# Patient Record
Sex: MALE | Race: WHITE | Employment: FULL TIME | URBAN - NONMETROPOLITAN AREA
[De-identification: names, ages, dates, MRNs, and addresses within clinical notes are randomized per-mention and may not be internally consistent; named-entity substitution may affect disease eponyms.]

---

## 2018-01-22 ENCOUNTER — HOSPITAL ENCOUNTER (EMERGENCY)
Age: 58
Discharge: HOME | End: 2018-01-22
Attending: EMERGENCY MEDICINE

## 2018-01-22 VITALS
RESPIRATION RATE: 18 BRPM | HEIGHT: 66 IN | WEIGHT: 230 LBS | BODY MASS INDEX: 36.96 KG/M2 | TEMPERATURE: 99 F | DIASTOLIC BLOOD PRESSURE: 90 MMHG | SYSTOLIC BLOOD PRESSURE: 138 MMHG | HEART RATE: 98 BPM | OXYGEN SATURATION: 97 %

## 2018-01-22 DIAGNOSIS — J02.9 ACUTE PHARYNGITIS, UNSPECIFIED ETIOLOGY: Primary | ICD-10-CM

## 2018-01-22 LAB
DIRECT EXAM: NORMAL
Lab: NORMAL
SPECIMEN DESCRIPTION: NORMAL
STATUS: NORMAL

## 2018-01-22 PROCEDURE — 99283 EMERGENCY DEPT VISIT LOW MDM: CPT

## 2018-01-22 PROCEDURE — 87804 INFLUENZA ASSAY W/OPTIC: CPT

## 2018-01-22 PROCEDURE — 87651 STREP A DNA AMP PROBE: CPT

## 2018-01-22 PROCEDURE — 6370000000 HC RX 637 (ALT 250 FOR IP): Performed by: EMERGENCY MEDICINE

## 2018-01-22 RX ORDER — AMOXICILLIN AND CLAVULANATE POTASSIUM 875; 125 MG/1; MG/1
1 TABLET, FILM COATED ORAL 2 TIMES DAILY
Qty: 20 TABLET | Refills: 0 | Status: SHIPPED | OUTPATIENT
Start: 2018-01-22 | End: 2018-02-01

## 2018-01-22 RX ORDER — AMOXICILLIN AND CLAVULANATE POTASSIUM 875; 125 MG/1; MG/1
1 TABLET, FILM COATED ORAL ONCE
Status: COMPLETED | OUTPATIENT
Start: 2018-01-22 | End: 2018-01-22

## 2018-01-22 RX ORDER — ACETAMINOPHEN 160 MG/5ML
1000 SOLUTION ORAL ONCE
Status: COMPLETED | OUTPATIENT
Start: 2018-01-22 | End: 2018-01-22

## 2018-01-22 RX ORDER — ACETAMINOPHEN 500 MG
1000 TABLET ORAL ONCE
Status: DISCONTINUED | OUTPATIENT
Start: 2018-01-22 | End: 2018-01-22

## 2018-01-22 RX ADMIN — LIDOCAINE HYDROCHLORIDE 15 ML: 20 SOLUTION ORAL; TOPICAL at 05:41

## 2018-01-22 RX ADMIN — AMOXICILLIN AND CLAVULANATE POTASSIUM 1 TABLET: 875; 125 TABLET, FILM COATED ORAL at 06:24

## 2018-01-22 RX ADMIN — ACETAMINOPHEN 1000 MG: 160 SOLUTION ORAL at 05:41

## 2018-01-22 ASSESSMENT — PAIN DESCRIPTION - PAIN TYPE
TYPE: ACUTE PAIN
TYPE: ACUTE PAIN

## 2018-01-22 ASSESSMENT — PAIN DESCRIPTION - DESCRIPTORS: DESCRIPTORS: ACHING;SORE

## 2018-01-22 ASSESSMENT — PAIN SCALES - GENERAL
PAINLEVEL_OUTOF10: 9
PAINLEVEL_OUTOF10: 6

## 2018-01-22 ASSESSMENT — PAIN DESCRIPTION - LOCATION
LOCATION: THROAT
LOCATION: THROAT

## 2018-01-22 NOTE — ED PROVIDER NOTES
suspect a false-negative strep. A backup culture will be obtained. Labs Reviewed   STREP SCREEN GROUP A THROAT   RAPID INFLUENZA A/B ANTIGENS   STREP A DNA PROBE, AMPLIFICATION         Labs Reviewed   STREP SCREEN GROUP A THROAT   RAPID INFLUENZA A/B ANTIGENS   STREP A DNA PROBE, AMPLIFICATION       FINAL IMPRESSION    1. Acute pharyngitis, unspecified etiology             Summation      Patient Course:        ED Medications administered this visit:    Medications   amoxicillin-clavulanate (AUGMENTIN) 875-125 MG per tablet 1 tablet (not administered)   lidocaine viscous (XYLOCAINE) 2 % solution 15 mL (15 mLs Mouth/Throat Given 1/22/18 0541)   acetaminophen (TYLENOL) 160 MG/5ML solution 1,000 mg (1,000 mg Oral Given 1/22/18 0541)       New Prescriptions from this visit:    New Prescriptions    AMOXICILLIN-CLAVULANATE (AUGMENTIN) 875-125 MG PER TABLET    Take 1 tablet by mouth 2 times daily for 10 days    LIDOCAINE VISCOUS (XYLOCAINE) 2 % SOLUTION    15 ML throat gargle every 4 hours as needed for throat pain. Follow-up:  family physician      As needed        Final Impression:   1.  Acute pharyngitis, unspecified etiology               (Please note that portions of this note were completed with a voice recognition program.  Efforts were made to edit the dictations but occasionally words are mis-transcribed.)          Ranulfo Ritchie DO  01/22/18 6524

## 2018-01-23 RX ORDER — AMOXICILLIN AND CLAVULANATE POTASSIUM 250; 62.5 MG/5ML; MG/5ML
500 POWDER, FOR SUSPENSION ORAL 3 TIMES DAILY
Qty: 300 ML | Refills: 0 | Status: SHIPPED | OUTPATIENT
Start: 2018-01-23 | End: 2018-02-02

## 2019-06-11 ENCOUNTER — HOSPITAL ENCOUNTER (INPATIENT)
Age: 59
LOS: 2 days | Discharge: HOME | DRG: 638 | End: 2019-06-13
Attending: EMERGENCY MEDICINE | Admitting: FAMILY MEDICINE
Payer: MEDICAID

## 2019-06-11 ENCOUNTER — APPOINTMENT (OUTPATIENT)
Dept: GENERAL RADIOLOGY | Age: 59
DRG: 638 | End: 2019-06-11

## 2019-06-11 ENCOUNTER — APPOINTMENT (OUTPATIENT)
Dept: CT IMAGING | Age: 59
DRG: 638 | End: 2019-06-11

## 2019-06-11 DIAGNOSIS — R10.13 EPIGASTRIC PAIN: ICD-10-CM

## 2019-06-11 DIAGNOSIS — E11.00 DIABETIC HYPEROSMOLAR NON-KETOTIC STATE (HCC): Primary | ICD-10-CM

## 2019-06-11 DIAGNOSIS — E11.9 DIABETES MELLITUS, NEW ONSET (HCC): ICD-10-CM

## 2019-06-11 PROBLEM — N17.9 AKI (ACUTE KIDNEY INJURY) (HCC): Status: ACTIVE | Noted: 2019-06-11

## 2019-06-11 LAB
-: NORMAL
ABSOLUTE EOS #: 0.03 K/UL (ref 0–0.44)
ABSOLUTE IMMATURE GRANULOCYTE: 0.04 K/UL (ref 0–0.3)
ABSOLUTE LYMPH #: 1.52 K/UL (ref 1.1–3.7)
ABSOLUTE MONO #: 0.67 K/UL (ref 0.1–1.2)
ALBUMIN SERPL-MCNC: 4.2 G/DL (ref 3.5–5.2)
ALBUMIN/GLOBULIN RATIO: 0.9 (ref 1–2.5)
ALLEN TEST: NORMAL
ALP BLD-CCNC: 284 U/L (ref 40–129)
ALT SERPL-CCNC: 54 U/L (ref 5–41)
AMORPHOUS: NORMAL
ANION GAP SERPL CALCULATED.3IONS-SCNC: 14 MMOL/L (ref 9–17)
ANION GAP SERPL CALCULATED.3IONS-SCNC: 20 MMOL/L (ref 9–17)
ANION GAP SERPL CALCULATED.3IONS-SCNC: 8 MMOL/L (ref 9–17)
AST SERPL-CCNC: 38 U/L
BACTERIA: NORMAL
BASOPHILS # BLD: 1 % (ref 0–2)
BASOPHILS ABSOLUTE: 0.05 K/UL (ref 0–0.2)
BETA-HYDROXYBUTYRATE: 0.36 MMOL/L (ref 0.02–0.27)
BILIRUB SERPL-MCNC: 0.78 MG/DL (ref 0.3–1.2)
BILIRUBIN URINE: NEGATIVE
BUN BLDV-MCNC: 13 MG/DL (ref 6–20)
BUN BLDV-MCNC: 15 MG/DL (ref 6–20)
BUN BLDV-MCNC: 18 MG/DL (ref 6–20)
BUN/CREAT BLD: 14 (ref 9–20)
BUN/CREAT BLD: 14 (ref 9–20)
BUN/CREAT BLD: 15 (ref 9–20)
CALCIUM SERPL-MCNC: 8.2 MG/DL (ref 8.6–10.4)
CALCIUM SERPL-MCNC: 8.8 MG/DL (ref 8.6–10.4)
CALCIUM SERPL-MCNC: 9.6 MG/DL (ref 8.6–10.4)
CARBOXYHEMOGLOBIN: NORMAL % (ref 0–5)
CASTS UA: NORMAL /LPF
CHLORIDE BLD-SCNC: 81 MMOL/L (ref 98–107)
CHLORIDE BLD-SCNC: 95 MMOL/L (ref 98–107)
CHLORIDE BLD-SCNC: 96 MMOL/L (ref 98–107)
CO2: 22 MMOL/L (ref 20–31)
CO2: 23 MMOL/L (ref 20–31)
CO2: 28 MMOL/L (ref 20–31)
COLOR: YELLOW
COMMENT UA: ABNORMAL
CREAT SERPL-MCNC: 0.9 MG/DL (ref 0.7–1.2)
CREAT SERPL-MCNC: 1.02 MG/DL (ref 0.7–1.2)
CREAT SERPL-MCNC: 1.33 MG/DL (ref 0.7–1.2)
CRYSTALS, UA: NORMAL /HPF
DIFFERENTIAL TYPE: ABNORMAL
EKG ATRIAL RATE: 96 BPM
EKG P AXIS: 74 DEGREES
EKG P-R INTERVAL: 168 MS
EKG Q-T INTERVAL: 344 MS
EKG QRS DURATION: 78 MS
EKG QTC CALCULATION (BAZETT): 434 MS
EKG R AXIS: -6 DEGREES
EKG T AXIS: 19 DEGREES
EKG VENTRICULAR RATE: 96 BPM
EOSINOPHILS RELATIVE PERCENT: 0 % (ref 1–4)
EPITHELIAL CELLS UA: NORMAL /HPF (ref 0–5)
ESTIMATED AVERAGE GLUCOSE: 272 MG/DL
FIO2: NORMAL
GFR AFRICAN AMERICAN: >60 ML/MIN
GFR NON-AFRICAN AMERICAN: 55 ML/MIN
GFR NON-AFRICAN AMERICAN: >60 ML/MIN
GFR NON-AFRICAN AMERICAN: >60 ML/MIN
GFR SERPL CREATININE-BSD FRML MDRD: ABNORMAL ML/MIN/{1.73_M2}
GLUCOSE BLD-MCNC: 181 MG/DL (ref 74–100)
GLUCOSE BLD-MCNC: 189 MG/DL (ref 74–100)
GLUCOSE BLD-MCNC: 194 MG/DL (ref 74–100)
GLUCOSE BLD-MCNC: 201 MG/DL (ref 70–99)
GLUCOSE BLD-MCNC: 212 MG/DL (ref 74–100)
GLUCOSE BLD-MCNC: 225 MG/DL (ref 74–100)
GLUCOSE BLD-MCNC: 225 MG/DL (ref 74–100)
GLUCOSE BLD-MCNC: 261 MG/DL (ref 74–100)
GLUCOSE BLD-MCNC: 308 MG/DL (ref 74–100)
GLUCOSE BLD-MCNC: 316 MG/DL (ref 74–100)
GLUCOSE BLD-MCNC: 346 MG/DL (ref 74–100)
GLUCOSE BLD-MCNC: 372 MG/DL (ref 74–100)
GLUCOSE BLD-MCNC: 373 MG/DL (ref 70–99)
GLUCOSE BLD-MCNC: 542 MG/DL (ref 74–100)
GLUCOSE BLD-MCNC: 956 MG/DL (ref 70–99)
GLUCOSE BLD-MCNC: >600 MG/DL (ref 74–100)
GLUCOSE URINE: ABNORMAL
HBA1C MFR BLD: 11.1 % (ref 4.8–5.9)
HCO3 VENOUS: 24.5 MMOL/L (ref 24–30)
HCT VFR BLD CALC: 51.4 % (ref 40.7–50.3)
HEMOGLOBIN: 17.9 G/DL (ref 13–17)
IMMATURE GRANULOCYTES: 0 %
KETONES, URINE: NEGATIVE
LACTIC ACID: 2.6 MMOL/L (ref 0.5–2.2)
LACTIC ACID: 4.8 MMOL/L (ref 0.5–2.2)
LEUKOCYTE ESTERASE, URINE: NEGATIVE
LIPASE: 112 U/L (ref 13–60)
LYMPHOCYTES # BLD: 16 % (ref 24–43)
MAGNESIUM: 2.1 MG/DL (ref 1.6–2.6)
MAGNESIUM: 2.6 MG/DL (ref 1.6–2.6)
MCH RBC QN AUTO: 31.5 PG (ref 25.2–33.5)
MCHC RBC AUTO-ENTMCNC: 34.8 G/DL (ref 28.4–34.8)
MCV RBC AUTO: 90.5 FL (ref 82.6–102.9)
METHEMOGLOBIN: NORMAL % (ref 0–1.9)
MODE: NORMAL
MONOCYTES # BLD: 7 % (ref 3–12)
MUCUS: NORMAL
NEGATIVE BASE EXCESS, VEN: 1.3 MMOL/L (ref 0–2)
NITRITE, URINE: NEGATIVE
NOTIFICATION TIME: NORMAL
NOTIFICATION: NORMAL
NRBC AUTOMATED: 0 PER 100 WBC
O2 DEVICE/FLOW/%: NORMAL
O2 SAT, VEN: 73.7 % (ref 60–85)
OTHER OBSERVATIONS UA: NORMAL
OXYHEMOGLOBIN: NORMAL % (ref 95–98)
PATIENT TEMP: 37
PCO2, VEN, TEMP ADJ: NORMAL MMHG (ref 39–55)
PCO2, VEN: 44.9 (ref 39–55)
PDW BLD-RTO: 12.2 % (ref 11.8–14.4)
PEEP/CPAP: NORMAL
PH UA: 5 (ref 5–9)
PH VENOUS: 7.35 (ref 7.32–7.42)
PH, VEN, TEMP ADJ: NORMAL (ref 7.32–7.42)
PHOSPHORUS: 2.3 MG/DL (ref 2.5–4.5)
PHOSPHORUS: 2.5 MG/DL (ref 2.5–4.5)
PLATELET # BLD: 164 K/UL (ref 138–453)
PLATELET ESTIMATE: ABNORMAL
PMV BLD AUTO: 12.2 FL (ref 8.1–13.5)
PO2, VEN, TEMP ADJ: NORMAL MMHG (ref 30–50)
PO2, VEN: 40.9 (ref 30–50)
POSITIVE BASE EXCESS, VEN: NORMAL MMOL/L (ref 0–2)
POTASSIUM SERPL-SCNC: 3.3 MMOL/L (ref 3.7–5.3)
POTASSIUM SERPL-SCNC: 4.2 MMOL/L (ref 3.7–5.3)
POTASSIUM SERPL-SCNC: 5 MMOL/L (ref 3.7–5.3)
PROTEIN UA: NEGATIVE
PSV: NORMAL
PT. POSITION: NORMAL
RBC # BLD: 5.68 M/UL (ref 4.21–5.77)
RBC # BLD: ABNORMAL 10*6/UL
RBC UA: NORMAL /HPF (ref 0–2)
RENAL EPITHELIAL, UA: NORMAL /HPF
RESPIRATORY RATE: NORMAL
SAMPLE SITE: NORMAL
SEG NEUTROPHILS: 75 % (ref 36–65)
SEGMENTED NEUTROPHILS ABSOLUTE COUNT: 6.98 K/UL (ref 1.5–8.1)
SET RATE: NORMAL
SODIUM BLD-SCNC: 123 MMOL/L (ref 135–144)
SODIUM BLD-SCNC: 131 MMOL/L (ref 135–144)
SODIUM BLD-SCNC: 133 MMOL/L (ref 135–144)
SPECIFIC GRAVITY UA: <1.005 (ref 1.01–1.02)
TEXT FOR RESPIRATORY: NORMAL
TOTAL CK: 168 U/L (ref 39–308)
TOTAL HB: NORMAL G/DL (ref 12–16)
TOTAL PROTEIN: 8.7 G/DL (ref 6.4–8.3)
TOTAL RATE: NORMAL
TRICHOMONAS: NORMAL
TROPONIN INTERP: NORMAL
TROPONIN T: <0.03 NG/ML
TROPONIN, HIGH SENSITIVITY: NORMAL NG/L (ref 0–22)
TURBIDITY: CLEAR
URINE HGB: NEGATIVE
UROBILINOGEN, URINE: NORMAL
VT: NORMAL
WBC # BLD: 9.3 K/UL (ref 3.5–11.3)
WBC # BLD: ABNORMAL 10*3/UL
WBC UA: NORMAL /HPF (ref 0–5)
YEAST: NORMAL

## 2019-06-11 PROCEDURE — 6370000000 HC RX 637 (ALT 250 FOR IP): Performed by: FAMILY MEDICINE

## 2019-06-11 PROCEDURE — 2000000000 HC ICU R&B

## 2019-06-11 PROCEDURE — 83605 ASSAY OF LACTIC ACID: CPT

## 2019-06-11 PROCEDURE — 82010 KETONE BODYS QUAN: CPT

## 2019-06-11 PROCEDURE — 84484 ASSAY OF TROPONIN QUANT: CPT

## 2019-06-11 PROCEDURE — 82805 BLOOD GASES W/O2 SATURATION: CPT

## 2019-06-11 PROCEDURE — 83690 ASSAY OF LIPASE: CPT

## 2019-06-11 PROCEDURE — 6370000000 HC RX 637 (ALT 250 FOR IP): Performed by: EMERGENCY MEDICINE

## 2019-06-11 PROCEDURE — 6360000002 HC RX W HCPCS: Performed by: EMERGENCY MEDICINE

## 2019-06-11 PROCEDURE — 82550 ASSAY OF CK (CPK): CPT

## 2019-06-11 PROCEDURE — 85025 COMPLETE CBC W/AUTO DIFF WBC: CPT

## 2019-06-11 PROCEDURE — 2500000003 HC RX 250 WO HCPCS: Performed by: PHYSICIAN ASSISTANT

## 2019-06-11 PROCEDURE — 74177 CT ABD & PELVIS W/CONTRAST: CPT

## 2019-06-11 PROCEDURE — 80048 BASIC METABOLIC PNL TOTAL CA: CPT

## 2019-06-11 PROCEDURE — 80053 COMPREHEN METABOLIC PANEL: CPT

## 2019-06-11 PROCEDURE — 2580000003 HC RX 258: Performed by: EMERGENCY MEDICINE

## 2019-06-11 PROCEDURE — 2580000003 HC RX 258: Performed by: PHYSICIAN ASSISTANT

## 2019-06-11 PROCEDURE — 6360000004 HC RX CONTRAST MEDICATION: Performed by: EMERGENCY MEDICINE

## 2019-06-11 PROCEDURE — 96374 THER/PROPH/DIAG INJ IV PUSH: CPT

## 2019-06-11 PROCEDURE — 99285 EMERGENCY DEPT VISIT HI MDM: CPT

## 2019-06-11 PROCEDURE — 6360000002 HC RX W HCPCS: Performed by: PHYSICIAN ASSISTANT

## 2019-06-11 PROCEDURE — 81001 URINALYSIS AUTO W/SCOPE: CPT

## 2019-06-11 PROCEDURE — 71046 X-RAY EXAM CHEST 2 VIEWS: CPT

## 2019-06-11 PROCEDURE — 96361 HYDRATE IV INFUSION ADD-ON: CPT

## 2019-06-11 PROCEDURE — 83036 HEMOGLOBIN GLYCOSYLATED A1C: CPT

## 2019-06-11 PROCEDURE — 96375 TX/PRO/DX INJ NEW DRUG ADDON: CPT

## 2019-06-11 PROCEDURE — 84100 ASSAY OF PHOSPHORUS: CPT

## 2019-06-11 PROCEDURE — 87086 URINE CULTURE/COLONY COUNT: CPT

## 2019-06-11 PROCEDURE — 93010 ELECTROCARDIOGRAM REPORT: CPT | Performed by: FAMILY MEDICINE

## 2019-06-11 PROCEDURE — 83735 ASSAY OF MAGNESIUM: CPT

## 2019-06-11 PROCEDURE — 36415 COLL VENOUS BLD VENIPUNCTURE: CPT

## 2019-06-11 PROCEDURE — 82947 ASSAY GLUCOSE BLOOD QUANT: CPT

## 2019-06-11 PROCEDURE — 93005 ELECTROCARDIOGRAM TRACING: CPT | Performed by: EMERGENCY MEDICINE

## 2019-06-11 RX ORDER — ONDANSETRON 2 MG/ML
4 INJECTION INTRAMUSCULAR; INTRAVENOUS EVERY 6 HOURS PRN
Status: DISCONTINUED | OUTPATIENT
Start: 2019-06-11 | End: 2019-06-13 | Stop reason: HOSPADM

## 2019-06-11 RX ORDER — DEXTROSE MONOHYDRATE 25 G/50ML
12.5 INJECTION, SOLUTION INTRAVENOUS PRN
Status: DISCONTINUED | OUTPATIENT
Start: 2019-06-11 | End: 2019-06-13 | Stop reason: HOSPADM

## 2019-06-11 RX ORDER — DEXTROSE MONOHYDRATE 50 MG/ML
100 INJECTION, SOLUTION INTRAVENOUS PRN
Status: DISCONTINUED | OUTPATIENT
Start: 2019-06-11 | End: 2019-06-13 | Stop reason: HOSPADM

## 2019-06-11 RX ORDER — MORPHINE SULFATE 2 MG/ML
1 INJECTION, SOLUTION INTRAMUSCULAR; INTRAVENOUS EVERY 4 HOURS PRN
Status: DISCONTINUED | OUTPATIENT
Start: 2019-06-11 | End: 2019-06-13 | Stop reason: HOSPADM

## 2019-06-11 RX ORDER — SYRING-NEEDL,DISP,INSUL,0.3 ML 30 GX5/16"
1 SYRINGE, EMPTY DISPOSABLE MISCELLANEOUS ONCE
Qty: 100 DEVICE | Refills: 0 | Status: SHIPPED | OUTPATIENT
Start: 2019-06-11 | End: 2020-03-11 | Stop reason: SDUPTHER

## 2019-06-11 RX ORDER — ONDANSETRON 2 MG/ML
4 INJECTION INTRAMUSCULAR; INTRAVENOUS EVERY 30 MIN PRN
Status: DISCONTINUED | OUTPATIENT
Start: 2019-06-11 | End: 2019-06-13 | Stop reason: HOSPADM

## 2019-06-11 RX ORDER — GLUCOSAMINE HCL/CHONDROITIN SU 500-400 MG
CAPSULE ORAL
Qty: 100 STRIP | Refills: 0 | Status: SHIPPED | OUTPATIENT
Start: 2019-06-11 | End: 2020-03-11 | Stop reason: SDUPTHER

## 2019-06-11 RX ORDER — PROMETHAZINE HYDROCHLORIDE 25 MG/ML
12.5 INJECTION, SOLUTION INTRAMUSCULAR; INTRAVENOUS EVERY 6 HOURS PRN
Status: DISCONTINUED | OUTPATIENT
Start: 2019-06-11 | End: 2019-06-13 | Stop reason: HOSPADM

## 2019-06-11 RX ORDER — MAGNESIUM SULFATE 1 G/100ML
1 INJECTION INTRAVENOUS PRN
Status: DISCONTINUED | OUTPATIENT
Start: 2019-06-11 | End: 2019-06-13 | Stop reason: HOSPADM

## 2019-06-11 RX ORDER — SODIUM PHOSPHATE, MONOBASIC, MONOHYDRATE 276; 142 MG/ML; MG/ML
INJECTION, SOLUTION INTRAVENOUS
Status: DISPENSED
Start: 2019-06-11 | End: 2019-06-12

## 2019-06-11 RX ORDER — POTASSIUM CHLORIDE 7.45 MG/ML
10 INJECTION INTRAVENOUS PRN
Status: DISCONTINUED | OUTPATIENT
Start: 2019-06-11 | End: 2019-06-13 | Stop reason: HOSPADM

## 2019-06-11 RX ORDER — ZOLPIDEM TARTRATE 5 MG/1
5 TABLET ORAL NIGHTLY PRN
Status: DISCONTINUED | OUTPATIENT
Start: 2019-06-11 | End: 2019-06-13 | Stop reason: HOSPADM

## 2019-06-11 RX ORDER — LORAZEPAM 2 MG/ML
0.5 INJECTION INTRAMUSCULAR ONCE
Status: COMPLETED | OUTPATIENT
Start: 2019-06-11 | End: 2019-06-11

## 2019-06-11 RX ORDER — 0.9 % SODIUM CHLORIDE 0.9 %
1000 INTRAVENOUS SOLUTION INTRAVENOUS ONCE
Status: COMPLETED | OUTPATIENT
Start: 2019-06-11 | End: 2019-06-11

## 2019-06-11 RX ORDER — NICOTINE POLACRILEX 4 MG
15 LOZENGE BUCCAL PRN
Status: DISCONTINUED | OUTPATIENT
Start: 2019-06-11 | End: 2019-06-13 | Stop reason: HOSPADM

## 2019-06-11 RX ORDER — SODIUM CHLORIDE 450 MG/100ML
INJECTION, SOLUTION INTRAVENOUS CONTINUOUS
Status: DISCONTINUED | OUTPATIENT
Start: 2019-06-11 | End: 2019-06-12

## 2019-06-11 RX ORDER — MAGNESIUM HYDROXIDE/ALUMINUM HYDROXICE/SIMETHICONE 120; 1200; 1200 MG/30ML; MG/30ML; MG/30ML
30 SUSPENSION ORAL ONCE
Status: COMPLETED | OUTPATIENT
Start: 2019-06-11 | End: 2019-06-11

## 2019-06-11 RX ORDER — DEXTROSE AND SODIUM CHLORIDE 5; .45 G/100ML; G/100ML
INJECTION, SOLUTION INTRAVENOUS CONTINUOUS PRN
Status: DISCONTINUED | OUTPATIENT
Start: 2019-06-11 | End: 2019-06-13 | Stop reason: HOSPADM

## 2019-06-11 RX ADMIN — IOPAMIDOL 75 ML: 755 INJECTION, SOLUTION INTRAVENOUS at 10:45

## 2019-06-11 RX ADMIN — SODIUM PHOSPHATE, MONOBASIC, MONOHYDRATE 10 MMOL: 276; 142 INJECTION, SOLUTION INTRAVENOUS at 21:17

## 2019-06-11 RX ADMIN — LORAZEPAM 0.5 MG: 2 INJECTION INTRAMUSCULAR; INTRAVENOUS at 10:00

## 2019-06-11 RX ADMIN — SODIUM CHLORIDE 1000 ML: 9 INJECTION, SOLUTION INTRAVENOUS at 10:48

## 2019-06-11 RX ADMIN — POTASSIUM CHLORIDE 10 MEQ: 10 INJECTION, SOLUTION INTRAVENOUS at 16:16

## 2019-06-11 RX ADMIN — SODIUM CHLORIDE 1000 ML: 9 INJECTION, SOLUTION INTRAVENOUS at 09:23

## 2019-06-11 RX ADMIN — DEXTROSE AND SODIUM CHLORIDE: 5; 450 INJECTION, SOLUTION INTRAVENOUS at 17:59

## 2019-06-11 RX ADMIN — SODIUM CHLORIDE 0.1 UNITS/KG/HR: 9 INJECTION, SOLUTION INTRAVENOUS at 11:06

## 2019-06-11 RX ADMIN — SODIUM CHLORIDE: 4.5 INJECTION, SOLUTION INTRAVENOUS at 12:19

## 2019-06-11 RX ADMIN — ZOLPIDEM TARTRATE 5 MG: 5 TABLET ORAL at 21:16

## 2019-06-11 RX ADMIN — SODIUM PHOSPHATE, MONOBASIC, MONOHYDRATE 10 MMOL: 276; 142 INJECTION, SOLUTION INTRAVENOUS at 14:36

## 2019-06-11 RX ADMIN — POTASSIUM CHLORIDE 10 MEQ: 10 INJECTION, SOLUTION INTRAVENOUS at 21:55

## 2019-06-11 RX ADMIN — ONDANSETRON 4 MG: 2 INJECTION INTRAMUSCULAR; INTRAVENOUS at 09:28

## 2019-06-11 RX ADMIN — POTASSIUM CHLORIDE 10 MEQ: 10 INJECTION, SOLUTION INTRAVENOUS at 14:12

## 2019-06-11 RX ADMIN — POTASSIUM CHLORIDE 10 MEQ: 10 INJECTION, SOLUTION INTRAVENOUS at 23:12

## 2019-06-11 RX ADMIN — POTASSIUM CHLORIDE 10 MEQ: 10 INJECTION, SOLUTION INTRAVENOUS at 20:36

## 2019-06-11 RX ADMIN — SODIUM CHLORIDE: 4.5 INJECTION, SOLUTION INTRAVENOUS at 16:19

## 2019-06-11 RX ADMIN — ALUMINUM HYDROXIDE, MAGNESIUM HYDROXIDE, AND SIMETHICONE 30 ML: 200; 200; 20 SUSPENSION ORAL at 09:23

## 2019-06-11 RX ADMIN — POTASSIUM CHLORIDE 10 MEQ: 10 INJECTION, SOLUTION INTRAVENOUS at 15:14

## 2019-06-11 ASSESSMENT — ENCOUNTER SYMPTOMS
FACIAL SWELLING: 0
BACK PAIN: 0
COUGH: 0
SORE THROAT: 0
WHEEZING: 0
TROUBLE SWALLOWING: 0
CHEST TIGHTNESS: 0
PHOTOPHOBIA: 0
ABDOMINAL PAIN: 1
NAUSEA: 1
DIARRHEA: 0
VOICE CHANGE: 0
SHORTNESS OF BREATH: 0
BLOOD IN STOOL: 0
VOMITING: 1

## 2019-06-11 ASSESSMENT — PAIN SCALES - GENERAL
PAINLEVEL_OUTOF10: 0

## 2019-06-11 NOTE — PROGRESS NOTES
Patient admitted to  from ER for DKA. Insulin drip infusing at 10.4 units/hr. Patient alert and oriented and denies any pain or shortness of breath. Patient appears anxious, states he does not like hospital or doctors and has not been to one for \"a long time\". Patient states is has been sick for over a week and having frequent urination and thirst. Navigator, assessment, vitals and blood sugar completed at this time. Educated patient on plan of care and blood glucose checks every hour.

## 2019-06-11 NOTE — ED PROVIDER NOTES
[facility]  EMERGENCY DEPARTMENT     Pt Name: Suzie Arenas  MRN: [id number]  [place] 1960  Date of evaluation: 6/11/2019  Provider: Arie Das DO, [address]       Chief Complaint   Patient presents with    Abdominal Pain     Epigastric burning, intermittent in the morning and at night x 2 weeks    Nausea     Intermittent x 2 weeks    Urinary Frequency     Ongoing x 1 week, with increased thirst       HISTORY OF PRESENT ILLNESS    Suzie Arenas is a 62 y.o. male who presents to the emergency department from home with complaints of epigastric abdominal pain and burning for the past 2 weeks, intermittent nausea and almost daily vomiting, increased frequency urination and increased polydipsia. No history of diabetes. No dark or bloody stools. No hematemesis. Denies any fevers or chills. Denies any weight loss. Has had a good appetite, but complains of a bloated sensation      Triage notes and Nursing notes were reviewed by myself. Any discrepancies are addressed above. PAST MEDICAL HISTORY   History reviewed. No pertinent past medical history. SURGICAL HISTORY       Past Surgical History:   Procedure Laterality Date    APPENDECTOMY         CURRENT MEDICATIONS       Previous Medications    ALBUTEROL (PROVENTIL HFA) 108 (90 BASE) MCG/ACT INHALER    Inhale 2 puffs into the lungs every 6 hours as needed for Wheezing. LIDOCAINE VISCOUS (XYLOCAINE) 2 % SOLUTION    15 ML throat gargle every 4 hours as needed for throat pain. RANITIDINE HCL (ZANTAC 150 MAXIMUM STRENGTH PO)    Take 150 mg by mouth 2 times daily       ALLERGIES     Patient has no known allergies. FAMILY HISTORY     History reviewed. No pertinent family history.      SOCIAL HISTORY       Social History     Socioeconomic History    Marital status:      Spouse name: None    Number of children: None    Years of education: None    Highest education level: None   Occupational History    None Social Needs    Financial resource strain: None    Food insecurity:     Worry: None     Inability: None    Transportation needs:     Medical: None     Non-medical: None   Tobacco Use    Smoking status: Current Every Day Smoker     Packs/day: 1.00     Types: Cigarettes    Smokeless tobacco: Never Used   Substance and Sexual Activity    Alcohol use: No    Drug use: None    Sexual activity: None   Lifestyle    Physical activity:     Days per week: None     Minutes per session: None    Stress: None   Relationships    Social connections:     Talks on phone: None     Gets together: None     Attends Judaism service: None     Active member of club or organization: None     Attends meetings of clubs or organizations: None     Relationship status: None    Intimate partner violence:     Fear of current or ex partner: None     Emotionally abused: None     Physically abused: None     Forced sexual activity: None   Other Topics Concern    None   Social History Narrative    None       REVIEW OF SYSTEMS     Review of Systems   Constitutional: Negative for chills, diaphoresis and fever. HENT: Negative for facial swelling, sore throat, trouble swallowing and voice change. Eyes: Negative for photophobia and visual disturbance. Respiratory: Negative for cough, chest tightness, shortness of breath and wheezing. Cardiovascular: Negative for chest pain, palpitations and leg swelling. Gastrointestinal: Positive for abdominal pain, nausea and vomiting. Negative for blood in stool and diarrhea. Endocrine: Negative for polydipsia and polyuria. Genitourinary: Positive for frequency. Negative for dysuria, flank pain, hematuria and urgency. Musculoskeletal: Negative for back pain, neck pain and neck stiffness. Skin: Negative for pallor and rash. Neurological: Negative for seizures, speech difficulty, weakness, numbness and headaches. Hematological: Does not bruise/bleed easily.    Psychiatric/Behavioral: Negative for confusion. The patient is not nervous/anxious. Except as noted above the remainder of the review of systems was reviewed and is. PHYSICAL EXAM    (up to 7 for level 4, 8 or more for level 5)     ED Triage Vitals [06/11/19 0852]   BP Temp Temp Source Pulse Resp SpO2 Height Weight   -- 96 °F (35.6 °C) Tympanic 103 18 95 % 5' 5\" (1.651 m) 240 lb (108.9 kg)       Physical Exam   Constitutional: He is oriented to person, place, and time. Vital signs are normal. He appears well-developed and well-nourished. Non-toxic appearance. He does not appear ill. No distress. HENT:   Head: Normocephalic and atraumatic. Mouth/Throat: Oropharynx is clear and moist.   Eyes: Pupils are equal, round, and reactive to light. Conjunctivae and EOM are normal. Right conjunctiva is not injected. Left conjunctiva is not injected. No scleral icterus. Neck: Normal range of motion. Neck supple. No tracheal deviation present. No thyromegaly present. Cardiovascular: Normal rate, regular rhythm, normal heart sounds and intact distal pulses. Exam reveals no gallop and no friction rub. No murmur heard. Pulmonary/Chest: Effort normal and breath sounds normal. No stridor. No respiratory distress. He has no wheezes. He has no rales. Abdominal: Soft. Bowel sounds are normal. He exhibits no distension and no mass. There is tenderness. There is no rebound and no guarding. Obese abdomen with some epigastric tenderness  Negative Weston's sign  Nontender McBurney's Point  Negative Rovsig's sign  No bruising or echymosis of abdomen   Musculoskeletal: He exhibits no edema or tenderness. Negative Wilda's Sign bilaterally   Lymphadenopathy:     He has no cervical adenopathy. Neurological: He is alert and oriented to person, place, and time. No cranial nerve deficit. He exhibits normal muscle tone. Coordination normal.   No nystagmus   Skin: Skin is warm and dry. No rash noted. He is not diaphoretic. No erythema. No pallor. Psychiatric: He has a normal mood and affect. His behavior is normal. Thought content normal.   Nursing note and vitals reviewed. DIAGNOSTIC RESULTS     EKG:(none if blank)  All [facility]'s are [facility] who either signs or Co-signs this chart in the absence of a cardiologist.    EKG is reviewed by myself. It shows a sinus rhythm, normal UT, QRS, and QTc intervals. No ectopy and no acute ischemic changes. RADIOLOGY: (none if blank)   Interpretation per the Radiologistbelow, if available at the time of this note:    XR CHEST STANDARD (2 VW)   Final Result   No acute cardiopulmonary disease.          CT ABDOMEN PELVIS W IV CONTRAST Additional Contrast? None    (Results Pending)       LABS:  Labs Reviewed   CBC WITH AUTO DIFFERENTIAL - Abnormal; Notable for the following components:       Result Value    Hemoglobin 17.9 (*)     Hematocrit 51.4 (*)     Seg Neutrophils 75 (*)     Lymphocytes 16 (*)     Eosinophils % 0 (*)     All other components within normal limits   COMPREHENSIVE METABOLIC PANEL W/ REFLEX TO MG FOR LOW K - Abnormal; Notable for the following components:    Glucose 956 (*)     CREATININE 1.33 (*)     Sodium 123 (*)     Chloride 81 (*)     Anion Gap 20 (*)     Alkaline Phosphatase 284 (*)     ALT 54 (*)     Total Protein 8.7 (*)     Albumin/Globulin Ratio 0.9 (*)     GFR Non- 55 (*)     All other components within normal limits   LIPASE - Abnormal; Notable for the following components:    Lipase 112 (*)     All other components within normal limits   URINALYSIS - Abnormal; Notable for the following components:    Glucose, Ur 3+ (*)     Specific Gravity, UA <1.005 (*)     All other components within normal limits   LACTIC ACID - Abnormal; Notable for the following components:    Lactic Acid 4.8 (*)     All other components within normal limits   BETA-HYDROXYBUTYRATE - Abnormal; Notable for the following components:    Beta-Hydroxybutyrate 0.36 (*)     All other components within normal limits   URINE CULTURE   MICROSCOPIC URINALYSIS   BLOOD GAS, VENOUS   CK   TROPONIN   LACTIC ACID   POCT GLUCOSE   POCT GLUCOSE       All other labs were within normal range or not returned as of this dictation. Please note, any cultures that may have been sent were not resulted at the time of this patient visit. EMERGENCY DEPARTMENT COURSE andMedical Decision Making:     MDM/   Patient presents with new onset diabetes, significant hyperglycemia and low hyperosmolar state. He does have slight elevation of the ketones, however is not acidotic. IV fluids were initiated and he was rehydrated with 2 L. Insulin drip was initiated as well. We did look for causes of the hyperglycemia, such as AMI, infection, cholecystitis, pancreatitis, etc.  Case d/w Dr Kim Washington who agrees to admit the patient pending CT Abd/pelvis results. ED Medications administered this visit:    Medications   ondansetron (ZOFRAN) injection 4 mg (4 mg Intravenous Given 6/11/19 0928)   0.9 % sodium chloride bolus (has no administration in time range)   glucose (GLUTOSE) 40 % oral gel 15 g (has no administration in time range)   dextrose 50 % IV solution (has no administration in time range)   glucagon (rDNA) injection 1 mg (has no administration in time range)   dextrose 5 % solution (has no administration in time range)   insulin regular (HUMULIN R;NOVOLIN R) 100 Units in sodium chloride 0.9 % 100 mL infusion (0.1 Units/kg/hr × 108.9 kg Intravenous New Bag 6/11/19 1106)   0.9 % sodium chloride bolus (0 mLs Intravenous Stopped 6/11/19 1038)   aluminum & magnesium hydroxide-simethicone (MAALOX) 200-200-20 MG/5ML suspension 30 mL (30 mLs Oral Given 6/11/19 0923)   LORazepam (ATIVAN) injection 0.5 mg (0.5 mg Intravenous Given 6/11/19 1000)   iopamidol (ISOVUE-370) 76 % injection 75 mL (75 mLs Intravenous Given 6/11/19 1045)         Procedures: (None if blank)       CLINICAL       1.  Diabetic hyperosmolar

## 2019-06-11 NOTE — H&P
ICU Admission [facility] Medicine  History and Physical    Patient:  Shaniqua Davila  MRN: [id number]    Chief Complaint:  Nausea, excessive thirst, abdominal pain    History Obtained From:  patient, electronic medical record    PCP: No primary care provider on file. History of Present Illness: The patient is a 62 y.o. male who presents with 1-2 weeks of worsening nausea, epigastric abdominal pain, and vomiting. Patient also reported excessive thirst and increased urination during that time. He has PHM significant for tobacco abuse, 1pk/day for 40+ years. He has no other PMH as he has not seen a provider for a \"long time. 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 Father and brother had DM. No fever or chills. No ill exposures. Denied neuropathy symptoms. In ER serum glucose over 900. HbA1C 11.1. U/A negative ketones. Anion gap 20. Na 123, K 5.0, Cr 1.33. He was admitted for diabetic nonketotic hyperosmolar state/new onset DM to the ICU. Past Medical History:        Diagnosis Date    Diabetes mellitus ([address]     Tobacco abuse        Past Surgical History:        Procedure Laterality Date    APPENDECTOMY         Family History:       Problem Relation Age of Onset    Diabetes Father     Diabetes Brother        Social History:   TOBACCO:   reports that he has been smoking cigarettes. He has been smoking about 1.00 pack per day. He has never used smokeless tobacco.  ETOH:   reports that he does not drink alcohol. OCCUPATION: installs [employer]    Allergies:  Patient has no known allergies. Medications Prior to Admission:    Prior to Admission medications    Medication Sig Start Date End Date Taking?  Authorizing Provider   raNITIdine HCl (ZANTAC 150 MAXIMUM STRENGTH PO) Take 150 mg by mouth 2 times daily   Yes Historical Provider, MD   Lancet Device MISC 1 Device by Does not apply route once for 1 dose 6/11/19 6/11/19 Yes Keo Guadarrama PA-C   blood glucose monitor strips Test 4 times a day & as needed for symptoms of irregular blood glucose. 6/11/19  Yes Keo Guadarrama PA-C   lidocaine viscous (XYLOCAINE) 2 % solution 15 ML throat gargle every 4 hours as needed for throat pain. 1/22/18   Thersia Brain, DO   albuterol (PROVENTIL HFA) 108 (90 BASE) MCG/ACT inhaler Inhale 2 puffs into the lungs every 6 hours as needed for Wheezing. 1/25/15   Ann Sylvester, APRN - CNP       Review of Systems:  Constitutional:negative  for fevers, and negative for chills. Eyes: negative for visual disturbance   ENT: negative for sore throat, negative nasal congestion, and negative for earache  Respiratory: negative for shortness of breath, negative for cough, and negative for wheezing  Cardiovascular: negative for chest pain, negative for palpitations, and negative for syncope  Gastrointestinal: positive for abdominal pain, positive for nausea,positive for vomiting, negative for diarrhea, negative for constipation, and negative for hematochezia or melena  Genitourinary: negative for dysuria, negative for urinary urgency, negative for urinary frequency, and negative for hematuria  Skin: negative for skin rash, and negative for skin lesions  Neurological: negative for unilateral weakness, numbness or tingling.     Physical Exam:    Vitals:  Temp: 97.8 °F (36.6 °C)  BP: (!) 141/98  Resp: 18  Pulse: 93  SpO2: 95 %  24HR INTAKE/OUTPUT:      Intake/Output Summary (Last 24 hours) at 6/11/2019 1355  Last data filed at 6/11/2019 1259  Gross per 24 hour   Intake --   Output 500 ml   Net -500 ml       Exam:  GEN:  alert and oriented to person, place and time, well-developed and well-nourished, in no acute distress  EYES: PERRL  NECK: normal, supple  PULM: clear to auscultation bilaterally- no wheezes, rales or rhonchi, normal air movement, no respiratory distress  COR: regular rate & rhythm and no murmurs  ABD:  Obese, soft, non-tender, non-distended, normal bowel sounds, no masses or organomegaly  EXT:   no cyanosis, clubbing or edema present    NEURO: follows commands, ALEXIS, no deficits  SKIN:  no rashes or significant lesions  -----------------------------------------------------------------  Diagnostic Data:   Lab Results   Component Value Date    WBC 9.3 06/11/2019    HGB 17.9 (H) 06/11/2019     06/11/2019       Lab Results   Component Value Date    BUN 18 06/11/2019    CREATININE 1.33 (H) 06/11/2019     (L) 06/11/2019    K 5.0 06/11/2019    CALCIUM 9.6 06/11/2019    CL 81 (LL) 06/11/2019    CO2 22 06/11/2019    LABGLOM 55 (L) 06/11/2019       Lab Results   Component Value Date    WBCUA None 06/11/2019    RBCUA None 06/11/2019    EPITHUA 0 TO 2 06/11/2019    LEUKOCYTESUR NEGATIVE 06/11/2019    SPECGRAV <1.005 (L) 06/11/2019    GLUCOSEU 3+ (A) 06/11/2019    KETUA NEGATIVE 06/11/2019    PROTEINU NEGATIVE 06/11/2019    HGBUR NEGATIVE 06/11/2019    CASTUA NOT REPORTED 06/11/2019    CRYSTUA NOT REPORTED 06/11/2019    BACTERIA NOT REPORTED 06/11/2019    YEAST NOT REPORTED 06/11/2019       Lab Results   Component Value Date    TROPONINT <0.03 06/11/2019    CKTOTAL 168 06/11/2019       Xr Chest Standard (2 Vw)    Result Date: 6/11/2019  EXAMINATION: TWO XRAY VIEWS OF THE CHEST 6/11/2019 10:53 am COMPARISON: None. HISTORY: ORDERING SYSTEM PROVIDED HISTORY: rule out infection TECHNOLOGIST PROVIDED HISTORY: rule quot infection Acute symptoms. Initial encounter. FINDINGS: Lungs are clear. No pleural effusion or pneumothorax. Normal cardiomediastinal silhouette and pulmonary vascularity. No acute osseous abnormality. No acute cardiopulmonary disease. Ct Abdomen Pelvis W Iv Contrast Additional Contrast? None    Result Date: 6/11/2019  EXAMINATION: CT OF THE ABDOMEN AND PELVIS WITH CONTRAST 6/11/2019 10:44 am TECHNIQUE: CT of the abdomen and pelvis was performed with the administration of intravenous contrast. Multiplanar reformatted images are provided for review.  Dose modulation, iterative reconstruction, and/or weight based adjustment of the mA/kV was utilized to reduce the radiation dose to as low as reasonably achievable. COMPARISON: None. HISTORY: ORDERING SYSTEM PROVIDED HISTORY: epigastric abd pain, bloating, vomiting x2 wks TECHNOLOGIST PROVIDED HISTORY: FINDINGS: Lower Chest: No acute abnormality noted in the included lung bases and heart base. No effusions. Organs: Hepatomegaly with hepatic steatosis is evident. Spleen, pancreas, gallbladder, adrenals, and kidneys are unremarkable with the exception of a 12 mm right hepatic cyst. GI/Bowel: No free fluid, free air, bowel obstruction, or bowel wall thickening is noted. Fat-containing periumbilical hernia of 2.3 cm without strangulation is evident. Sigmoid diverticulosis, moderate, without evidence of acute diverticulitis is noted. Pelvis: No abnormal fluid collection, pelvic or inguinal adenopathy is noted. Left inguinal ring is dilated and fat containing without strangulation. As previously noted, moderate sigmoid diverticulosis without acute diverticulitis is noted. Bladder is unremarkable. Peritoneum/Retroperitoneum: No aortic aneurysm, retroperitoneal mass, retroperitoneal or mesenteric adenopathy is seen. Bones/Soft Tissues: No acute osseous or soft tissue abnormality. Multilevel degenerative changes in the spine. Estimated biologic radiation dose for this procedure:1027.12 mGy/cm2.     1.  Hepatomegaly and hepatic steatosis without space-occupying mass lesion. 2.  Sigmoid diverticulosis without acute diverticulitis. 3.  Periumbilical and left inguinal ring fat containing hernias without strangulation. 4.  Other incidental findings as above. There is no bowel obstruction, gallstones, or appendicitis. EKG interpretation: NSR      Assessment:    Principal Problem:    Diabetic hyperosmolar non-ketotic state ([address]  Active Problems:    Diabetes mellitus, new onset ([address]    DENA (acute kidney injury) ([address]    Tobacco abuse  Resolved Problems:    * No resolved hospital problems. *      Patient Active Problem List    Diagnosis Date Noted    Diabetic hyperosmolar non-ketotic state ([facility] 06/11/2019    Diabetes mellitus, new onset ([facility] 06/11/2019    DENA (acute kidney injury) ([facility] 06/11/2019    Tobacco abuse        Plan:     · This patient requires inpatient ICU admission because of diabetic non ketotic hyperosmolar state, new onset DM, DENA  · Factors affecting the medical complexity of this patient include tobacco abuse  · Estimated length of stay is 3 days  · Diabetic educator   · Dietician consult  · DKA protocol (same treatment as diabetic nonketotic hyperosmolar state)  · Needs to establish PCP  · High risk medications: insulin gtt    I spent 45 minutes providing critical care management to this patient. This excludes time spent in performing separately billed procedures    CORE MEASURES  DVT prophylaxis: Lovenox  Decubitus ulcer present on admission: No  CODE STATUS: FULL CODE  Nutrition Status: fair   Physical therapy: NA   Old Charts reviewed: No  EKG Reviewed: Yes  Advance Directive Addressed:  Yes    Iggy Alvarenga PA-C  6/11/2019, 1:55 PM

## 2019-06-12 PROBLEM — E87.6 HYPOKALEMIA: Status: ACTIVE | Noted: 2019-06-12

## 2019-06-12 PROBLEM — I10 HYPERTENSION: Status: ACTIVE | Noted: 2019-06-12

## 2019-06-12 PROBLEM — E44.0 MODERATE MALNUTRITION (HCC): Status: ACTIVE | Noted: 2019-06-12

## 2019-06-12 LAB
ANION GAP SERPL CALCULATED.3IONS-SCNC: 7 MMOL/L (ref 9–17)
BUN BLDV-MCNC: 12 MG/DL (ref 6–20)
BUN/CREAT BLD: 14 (ref 9–20)
CALCIUM SERPL-MCNC: 7.9 MG/DL (ref 8.6–10.4)
CHLORIDE BLD-SCNC: 100 MMOL/L (ref 98–107)
CO2: 27 MMOL/L (ref 20–31)
CREAT SERPL-MCNC: 0.84 MG/DL (ref 0.7–1.2)
CULTURE: NORMAL
GFR AFRICAN AMERICAN: >60 ML/MIN
GFR NON-AFRICAN AMERICAN: >60 ML/MIN
GFR SERPL CREATININE-BSD FRML MDRD: ABNORMAL ML/MIN/{1.73_M2}
GFR SERPL CREATININE-BSD FRML MDRD: ABNORMAL ML/MIN/{1.73_M2}
GLUCOSE BLD-MCNC: 150 MG/DL (ref 74–100)
GLUCOSE BLD-MCNC: 162 MG/DL (ref 74–100)
GLUCOSE BLD-MCNC: 167 MG/DL (ref 74–100)
GLUCOSE BLD-MCNC: 170 MG/DL (ref 74–100)
GLUCOSE BLD-MCNC: 171 MG/DL (ref 70–99)
GLUCOSE BLD-MCNC: 173 MG/DL (ref 74–100)
GLUCOSE BLD-MCNC: 177 MG/DL (ref 74–100)
GLUCOSE BLD-MCNC: 179 MG/DL (ref 74–100)
GLUCOSE BLD-MCNC: 185 MG/DL (ref 74–100)
GLUCOSE BLD-MCNC: 218 MG/DL (ref 74–100)
GLUCOSE BLD-MCNC: 259 MG/DL (ref 74–100)
GLUCOSE BLD-MCNC: 292 MG/DL (ref 74–100)
GLUCOSE BLD-MCNC: 328 MG/DL (ref 74–100)
Lab: NORMAL
MAGNESIUM: 2 MG/DL (ref 1.6–2.6)
PHOSPHORUS: 2.4 MG/DL (ref 2.5–4.5)
POTASSIUM SERPL-SCNC: 3.4 MMOL/L (ref 3.7–5.3)
SODIUM BLD-SCNC: 134 MMOL/L (ref 135–144)
SPECIMEN DESCRIPTION: NORMAL

## 2019-06-12 PROCEDURE — 6370000000 HC RX 637 (ALT 250 FOR IP): Performed by: PHYSICIAN ASSISTANT

## 2019-06-12 PROCEDURE — 80048 BASIC METABOLIC PNL TOTAL CA: CPT

## 2019-06-12 PROCEDURE — 82947 ASSAY GLUCOSE BLOOD QUANT: CPT

## 2019-06-12 PROCEDURE — 2580000003 HC RX 258: Performed by: EMERGENCY MEDICINE

## 2019-06-12 PROCEDURE — 83735 ASSAY OF MAGNESIUM: CPT

## 2019-06-12 PROCEDURE — 36415 COLL VENOUS BLD VENIPUNCTURE: CPT

## 2019-06-12 PROCEDURE — 2500000003 HC RX 250 WO HCPCS: Performed by: PHYSICIAN ASSISTANT

## 2019-06-12 PROCEDURE — 84100 ASSAY OF PHOSPHORUS: CPT

## 2019-06-12 PROCEDURE — 6360000002 HC RX W HCPCS: Performed by: PHYSICIAN ASSISTANT

## 2019-06-12 PROCEDURE — 6370000000 HC RX 637 (ALT 250 FOR IP): Performed by: EMERGENCY MEDICINE

## 2019-06-12 PROCEDURE — 2580000003 HC RX 258: Performed by: PHYSICIAN ASSISTANT

## 2019-06-12 PROCEDURE — 1200000000 HC SEMI PRIVATE

## 2019-06-12 RX ORDER — POTASSIUM CHLORIDE 20 MEQ/1
40 TABLET, EXTENDED RELEASE ORAL
Status: DISCONTINUED | OUTPATIENT
Start: 2019-06-12 | End: 2019-06-13 | Stop reason: HOSPADM

## 2019-06-12 RX ORDER — SODIUM PHOSPHATE, MONOBASIC, MONOHYDRATE 276; 142 MG/ML; MG/ML
INJECTION, SOLUTION INTRAVENOUS
Status: DISPENSED
Start: 2019-06-12 | End: 2019-06-12

## 2019-06-12 RX ORDER — NICOTINE POLACRILEX 4 MG
15 LOZENGE BUCCAL PRN
Status: DISCONTINUED | OUTPATIENT
Start: 2019-06-12 | End: 2019-06-13 | Stop reason: HOSPADM

## 2019-06-12 RX ORDER — GLIMEPIRIDE 1 MG/1
2 TABLET ORAL
Status: DISCONTINUED | OUTPATIENT
Start: 2019-06-12 | End: 2019-06-12

## 2019-06-12 RX ORDER — DEXTROSE MONOHYDRATE 50 MG/ML
100 INJECTION, SOLUTION INTRAVENOUS PRN
Status: DISCONTINUED | OUTPATIENT
Start: 2019-06-12 | End: 2019-06-13 | Stop reason: HOSPADM

## 2019-06-12 RX ORDER — SODIUM CHLORIDE 9 MG/ML
INJECTION, SOLUTION INTRAVENOUS CONTINUOUS
Status: DISCONTINUED | OUTPATIENT
Start: 2019-06-12 | End: 2019-06-13 | Stop reason: HOSPADM

## 2019-06-12 RX ORDER — GLIMEPIRIDE 1 MG/1
0.5 TABLET ORAL ONCE
Status: COMPLETED | OUTPATIENT
Start: 2019-06-12 | End: 2019-06-12

## 2019-06-12 RX ORDER — GLIMEPIRIDE 1 MG/1
0.5 TABLET ORAL
Status: DISCONTINUED | OUTPATIENT
Start: 2019-06-12 | End: 2019-06-13

## 2019-06-12 RX ORDER — DEXTROSE MONOHYDRATE 25 G/50ML
12.5 INJECTION, SOLUTION INTRAVENOUS PRN
Status: DISCONTINUED | OUTPATIENT
Start: 2019-06-12 | End: 2019-06-13 | Stop reason: HOSPADM

## 2019-06-12 RX ORDER — LISINOPRIL 20 MG/1
20 TABLET ORAL DAILY
Status: DISCONTINUED | OUTPATIENT
Start: 2019-06-12 | End: 2019-06-13 | Stop reason: HOSPADM

## 2019-06-12 RX ORDER — GLIMEPIRIDE 1 MG/1
0.5 TABLET ORAL
Status: DISCONTINUED | OUTPATIENT
Start: 2019-06-12 | End: 2019-06-12

## 2019-06-12 RX ADMIN — GLIMEPIRIDE 0.5 MG: 1 TABLET ORAL at 11:33

## 2019-06-12 RX ADMIN — POTASSIUM CHLORIDE 10 MEQ: 10 INJECTION, SOLUTION INTRAVENOUS at 00:28

## 2019-06-12 RX ADMIN — INSULIN LISPRO 8 UNITS: 100 INJECTION, SOLUTION INTRAVENOUS; SUBCUTANEOUS at 11:32

## 2019-06-12 RX ADMIN — GLIMEPIRIDE 0.5 MG: 1 TABLET ORAL at 09:03

## 2019-06-12 RX ADMIN — DEXTROSE AND SODIUM CHLORIDE: 5; 450 INJECTION, SOLUTION INTRAVENOUS at 06:55

## 2019-06-12 RX ADMIN — POTASSIUM CHLORIDE 10 MEQ: 10 INJECTION, SOLUTION INTRAVENOUS at 06:55

## 2019-06-12 RX ADMIN — METFORMIN HYDROCHLORIDE 500 MG: 500 TABLET ORAL at 16:26

## 2019-06-12 RX ADMIN — SODIUM PHOSPHATE, MONOBASIC, MONOHYDRATE 10 MMOL: 276; 142 INJECTION, SOLUTION INTRAVENOUS at 04:03

## 2019-06-12 RX ADMIN — INSULIN LISPRO 6 UNITS: 100 INJECTION, SOLUTION INTRAVENOUS; SUBCUTANEOUS at 16:24

## 2019-06-12 RX ADMIN — LISINOPRIL 20 MG: 20 TABLET ORAL at 09:03

## 2019-06-12 RX ADMIN — SODIUM CHLORIDE: 9 INJECTION, SOLUTION INTRAVENOUS at 08:27

## 2019-06-12 RX ADMIN — SODIUM CHLORIDE: 9 INJECTION, SOLUTION INTRAVENOUS at 17:39

## 2019-06-12 RX ADMIN — METFORMIN HYDROCHLORIDE 500 MG: 500 TABLET ORAL at 09:02

## 2019-06-12 RX ADMIN — POTASSIUM CHLORIDE 10 MEQ: 10 INJECTION, SOLUTION INTRAVENOUS at 04:02

## 2019-06-12 RX ADMIN — INSULIN LISPRO 4 UNITS: 100 INJECTION, SOLUTION INTRAVENOUS; SUBCUTANEOUS at 09:03

## 2019-06-12 RX ADMIN — POTASSIUM CHLORIDE 40 MEQ: 20 TABLET, EXTENDED RELEASE ORAL at 09:03

## 2019-06-12 RX ADMIN — GLIMEPIRIDE 0.5 MG: 1 TABLET ORAL at 16:26

## 2019-06-12 RX ADMIN — INSULIN LISPRO 3 UNITS: 100 INJECTION, SOLUTION INTRAVENOUS; SUBCUTANEOUS at 20:01

## 2019-06-12 RX ADMIN — SODIUM CHLORIDE 6.25 UNITS/HR: 9 INJECTION, SOLUTION INTRAVENOUS at 00:10

## 2019-06-12 RX ADMIN — ONDANSETRON 4 MG: 2 INJECTION INTRAMUSCULAR; INTRAVENOUS at 16:26

## 2019-06-12 RX ADMIN — POTASSIUM CHLORIDE 10 MEQ: 10 INJECTION, SOLUTION INTRAVENOUS at 05:25

## 2019-06-12 RX ADMIN — DEXTROSE AND SODIUM CHLORIDE: 5; 450 INJECTION, SOLUTION INTRAVENOUS at 00:10

## 2019-06-12 ASSESSMENT — PAIN SCALES - GENERAL
PAINLEVEL_OUTOF10: 0

## 2019-06-12 NOTE — PLAN OF CARE
Problem: Falls - Risk of:  Goal: Will remain free from falls  Description  Will remain free from falls  6/12/2019 0813 by Cira Juárez RN  Outcome: Ongoing  Note:   Patient is alert and oriented and has demonstrated the use of using the call light for assistance before getting up. Education has been provided to defer the use of the bed alarm and/or restraint free alarm as the patient has shown competency in calling for assistance and verbalizing the risk. Patient is one assist.         Problem: Pain:  Description  Pain management should include both nonpharmacologic and pharmacologic interventions. Goal: Pain level will decrease  Description  Pain level will decrease  6/12/2019 0813 by Cira Juárez RN  Outcome: Ongoing  Note:   Pt is able to rate pain using a 0-10 scale. Medication, ice and repositioning reduce the pain if needed and pt is aware that it is available. Will continue to monitor. Denies any pain. Problem: Discharge Planning:  Goal: Discharged to appropriate level of care  Description  Discharged to appropriate level of care  Outcome: Ongoing  Note:   Plan is discharge to home in morning. Problem: Fluid Volume - Imbalance:  Goal: Will remain free of signs and symptoms of dehydration  Description  Will remain free of signs and symptoms of dehydration  6/12/2019 0813 by Cira Juárez RN  Outcome: Ongoing  Note:   IV fluids infusing. Tolerating oral fluids well. Problem: Fluid Volume - Imbalance:  Goal: Absence of imbalanced fluid volume signs and symptoms  Description  Absence of imbalanced fluid volume signs and symptoms  Outcome: Ongoing     Problem: Serum Glucose Level - Abnormal:  Goal: Ability to maintain appropriate glucose levels will improve  Description  Ability to maintain appropriate glucose levels will improve  6/12/2019 0813 by Cira Juárez RN  Outcome: Ongoing  Note:   Blood sugars checked ACHS.       Problem: Injury - Acid Base Imbalance:  Goal: Acid-base balance  Description  Acid-base balance  Outcome: Ongoing  Note:   Labs ordered daily.

## 2019-06-12 NOTE — PROGRESS NOTES
Pt requested Ambien for sleep. Writer gave to pt in medication cup. Pt took pill out of cup and put threw it in his mouth. Pt states that the pill fell and he did not swallow it. Writer and pt looked for Ambien but unable to locate it. Pt stood up, shook gown, looked on floor and looked in bed, pulling covers off bed. Still unable to locate it.

## 2019-06-12 NOTE — PLAN OF CARE
Problem: Falls - Risk of:  Goal: Will remain free from falls  Description  Will remain free from falls  6/12/2019 1930 by Michael Tinajero RN  Outcome: Ongoing  Note:   Patient is alert and oriented and has demonstrated the use of using the call light for assistance before getting up. Education has been provided to defer the use of the bed alarm and/or restraint free alarm as the patient has shown competency in calling for assistance and verbalizing the risk. 6/12/2019 0813 by Paxton Meza RN  Outcome: Ongoing  Note:   Patient is alert and oriented and has demonstrated the use of using the call light for assistance before getting up. Education has been provided to defer the use of the bed alarm and/or restraint free alarm as the patient has shown competency in calling for assistance and verbalizing the risk. Patient is one assist.         Problem: Pain:  Description  Pain management should include both nonpharmacologic and pharmacologic interventions. Goal: Pain level will decrease  Description  Pain level will decrease  6/12/2019 1930 by Michael Tinajero RN  Outcome: Ongoing  Note:   Pain assessed every 4 hours. Patient is able to communicate with staff the need for pain interventions. Patient currently denies pain. Will continue to monitor. 6/12/2019 0813 by Paxton Meza RN  Outcome: Ongoing  Note:   Pt is able to rate pain using a 0-10 scale. Medication, ice and repositioning reduce the pain if needed and pt is aware that it is available. Will continue to monitor. Denies any pain. Problem: Discharge Planning:  Goal: Discharged to appropriate level of care  Description  Discharged to appropriate level of care  6/12/2019 1930 by Michael Tinajero RN  Outcome: Ongoing  6/12/2019 0813 by Paxton Meza RN  Outcome: Ongoing  Note:   Plan is discharge to home in morning.       Problem: Fluid Volume - Imbalance:  Goal: Will remain free of signs and symptoms of dehydration  Description  Will remain

## 2019-06-12 NOTE — PROGRESS NOTES
[facility] Report given to Letty RN. Diabetic educator at bedside.

## 2019-06-12 NOTE — PROGRESS NOTES
Facsimile Transmission Cover Sheet    Information contained in this transmission is for the sole use of the intended recipients and may contain confidential and privileged information. Any unauthorized review, use, disclosure or distribution is prohibited. If you are not the intended recipient, please contact the sender and destroy all copies of the original message. Disclosure is made for the purpose of healthcare operations and continuity of care. To: __Dr. Brar________________________    From: [facility]    Fax Number: [phone]    Sender:DIXON Weaver ________________ Phone Number:_[phone]___________      This request is: Urgent - NO    Information and/or questions regarding patient condition:    Pt reporting \"heartburn\", no PRN antacids available. May we have an order to help relieve heartburn? Physician Signature, Date and Time needed for any orders written on this fax. Thank you.     Signature_____________________ Date __________ Time _______

## 2019-06-12 NOTE — PROGRESS NOTES
Met with Patient and his wife and mother this p.m. Patient is a 62year old male, admitted with Diabetic hyperosmolar non-ketotic state. He is alert and oriented states that he has not been in the hospital \"for 30 years. \"  Appears anxious as both he and family are responding to questions being asked. Patient resides in [facility] with his wife. He is employed at [employer] as a . Patient states that he uses no DME and no outside resources or services at this time. He does drive. Has no PCP listed at this time. Discussed follow up care and Patient chooses [facility] office to be referred to.  made aware and will schedule Patient for follow up. Wife states that Patient has no insurance at this time. Will need assistance with paying for prescriptions upon discharge. Patient wishes to be discharged home with his wife when he is medically stable. Has no Advanced Directives on file at this time and \"need time to think about\" when asked if he would like to complete. Patient eager to return to work and to be discharged from the hospital \"hopefully tomorrow. \"  Much reassurance and support provided at this time. Will continue to monitor and assist with discharge planning as appropriate.     Davidda. [address]  6/12/2019

## 2019-06-12 NOTE — PROGRESS NOTES
Stopped in to see patient. His wife just left but will be back and she would like to be present for education. Pt phoned wife and she will be back around 11 am. Asked wife if able to get glucometer and supplies and bring for instruction.

## 2019-06-12 NOTE — PROGRESS NOTES
Nutrition Assessment    Type and Reason for Visit: Initial, Consult(NODM)    Nutrition Recommendations:  4 carbs per meal    Nutrition Assessment: Food and nutrition related knowledge deficit r/t endocrine dysfunction, AEB New Diagnosis of Diabetes. Moderate (acute malnutrition) with weight losses and low PO x 2 weeks. Will begin education process. Recommend 4 carbs per meal as goal.  Formerly eating only 1 or 2 meals at most pta; advised of 3 meal a day strategy. Malnutrition Assessment:  · Malnutrition Status: Meets the criteria for moderate malnutrition  · Context: Acute illness or injury  · Findings of the 6 clinical characteristics of malnutrition (Minimum of 2 out of 6 clinical characteristics is required to make the diagnosis of moderate or severe Protein Calorie Malnutrition based on AND/ASPEN Guidelines):  1. Energy Intake-Less than or equal to 75% of estimated energy requirement, Greater than or equal to 7 days    2. Weight Loss-5% loss or greater, in 1 month  3. Fat Loss-No significant subcutaneous fat loss,    4. Muscle Loss-No significant muscle mass loss,    5. Fluid Accumulation-No significant fluid accumulation,    6.  Strength-Not measured    Nutrition Risk Level:  Moderate, High    Nutrient Needs:  · Estimated Daily Total Kcal: 5517-6410(37-64)  · Estimated Daily Protein (g): 74-80(1.2-1.3)  · Estimated Daily Total Fluid (ml/day): 1900+    Nutrition Diagnosis:   · Problem: Food and nutrition-related knowledge deficit, Moderate malnutrition  · Etiology: related to Endocrine dysfunction     Signs and symptoms:  as evidenced by Lab values, Weight loss greater than or equal to 5% in 1 month(and NODM)    Objective Information:  · Nutrition-Focused Physical Findings: obese  · Wound Type: None  · Current Nutrition Therapies:  · Oral Diet Orders: Clear Liquid   · Oral Diet intake: Unable to assess(No PO recorded)  · Oral Nutrition Supplement (ONS) Orders: None  · Anthropometric Measures:  · Ht: 5' 5\" (165.1 cm)   · Current Body Wt: 226 lb 4.8 oz (102.6 kg)  · Admission Body Wt: 240 lb (108.9 kg)  · Usual Body Wt: 240 lb (108.9 kg)  · % Weight Change:  ,  5.8% acute loss  · Ideal Body Wt: 136 lb (61.7 kg), % Ideal Body 166%  · BMI Classification: BMI 35.0 - 39.9 Obese Class II    Lab Results   Component Value Date     (L) 06/12/2019    K 3.4 (L) 06/12/2019     06/12/2019    CO2 27 06/12/2019    BUN 12 06/12/2019    CREATININE 0.84 06/12/2019    GLUCOSE 171 (H) 06/12/2019    CALCIUM 7.9 (L) 06/12/2019    PROT 8.7 (H) 06/11/2019    LABALBU 4.2 06/11/2019    BILITOT 0.78 06/11/2019    ALKPHOS 284 (H) 06/11/2019    AST 38 06/11/2019    ALT 54 (H) 06/11/2019    LABGLOM >60 06/12/2019    GFRAA >60 06/12/2019     Lab Results   Component Value Date    LABA1C 11.1 (H) 06/11/2019     Lab Results   Component Value Date     06/11/2019     Nutrition Interventions:   Modify current diet(advance towards 4 carb per meal as tolerated)  Continued Inpatient Monitoring, Education Needed, Education Initiated, Coordination of Care    Nutrition Evaluation:   · Evaluation: Goals set   · Goals: PO >75% meals on advanced diet    · Monitoring: Meal Intake, I&O, Pertinent Labs, Weight, Patient/Family Education    Electronically signed by Walt Gudino RD, LD on 6/12/19 at 7:09 AM    Contact Number: [phone]

## 2019-06-13 VITALS
BODY MASS INDEX: 38.97 KG/M2 | HEIGHT: 65 IN | RESPIRATION RATE: 16 BRPM | HEART RATE: 84 BPM | SYSTOLIC BLOOD PRESSURE: 134 MMHG | WEIGHT: 233.9 LBS | OXYGEN SATURATION: 96 % | TEMPERATURE: 98.8 F | DIASTOLIC BLOOD PRESSURE: 74 MMHG

## 2019-06-13 PROBLEM — E87.20 LACTIC ACIDOSIS: Status: ACTIVE | Noted: 2019-06-13

## 2019-06-13 LAB
ANION GAP SERPL CALCULATED.3IONS-SCNC: 9 MMOL/L (ref 9–17)
BUN BLDV-MCNC: 10 MG/DL (ref 6–20)
BUN/CREAT BLD: 13 (ref 9–20)
CALCIUM SERPL-MCNC: 8 MG/DL (ref 8.6–10.4)
CHLORIDE BLD-SCNC: 104 MMOL/L (ref 98–107)
CO2: 22 MMOL/L (ref 20–31)
CREAT SERPL-MCNC: 0.78 MG/DL (ref 0.7–1.2)
GFR AFRICAN AMERICAN: >60 ML/MIN
GFR NON-AFRICAN AMERICAN: >60 ML/MIN
GFR SERPL CREATININE-BSD FRML MDRD: ABNORMAL ML/MIN/{1.73_M2}
GFR SERPL CREATININE-BSD FRML MDRD: ABNORMAL ML/MIN/{1.73_M2}
GLUCOSE BLD-MCNC: 254 MG/DL (ref 74–100)
GLUCOSE BLD-MCNC: 262 MG/DL (ref 70–99)
GLUCOSE BLD-MCNC: 285 MG/DL (ref 74–100)
HCT VFR BLD CALC: 42.1 % (ref 40.7–50.3)
HEMOGLOBIN: 14.7 G/DL (ref 13–17)
MCH RBC QN AUTO: 31.5 PG (ref 25.2–33.5)
MCHC RBC AUTO-ENTMCNC: 34.9 G/DL (ref 28.4–34.8)
MCV RBC AUTO: 90.3 FL (ref 82.6–102.9)
NRBC AUTOMATED: 0 PER 100 WBC
PDW BLD-RTO: 12.4 % (ref 11.8–14.4)
PLATELET # BLD: ABNORMAL K/UL (ref 138–453)
PLATELET, FLUORESCENCE: 135 K/UL (ref 138–453)
PLATELET, IMMATURE FRACTION: 6.6 % (ref 1.1–10.3)
PMV BLD AUTO: ABNORMAL FL (ref 8.1–13.5)
POTASSIUM SERPL-SCNC: 4.2 MMOL/L (ref 3.7–5.3)
RBC # BLD: 4.66 M/UL (ref 4.21–5.77)
SODIUM BLD-SCNC: 135 MMOL/L (ref 135–144)
WBC # BLD: 8.2 K/UL (ref 3.5–11.3)

## 2019-06-13 PROCEDURE — 6370000000 HC RX 637 (ALT 250 FOR IP): Performed by: PHYSICIAN ASSISTANT

## 2019-06-13 PROCEDURE — 82947 ASSAY GLUCOSE BLOOD QUANT: CPT

## 2019-06-13 PROCEDURE — 36415 COLL VENOUS BLD VENIPUNCTURE: CPT

## 2019-06-13 PROCEDURE — 85027 COMPLETE CBC AUTOMATED: CPT

## 2019-06-13 PROCEDURE — 80048 BASIC METABOLIC PNL TOTAL CA: CPT

## 2019-06-13 RX ORDER — LISINOPRIL 20 MG/1
20 TABLET ORAL DAILY
Qty: 30 TABLET | Refills: 0 | Status: SHIPPED | OUTPATIENT
Start: 2019-06-14 | End: 2019-07-08 | Stop reason: SDUPTHER

## 2019-06-13 RX ORDER — PANTOPRAZOLE SODIUM 40 MG/1
40 TABLET, DELAYED RELEASE ORAL
Status: DISCONTINUED | OUTPATIENT
Start: 2019-06-13 | End: 2019-06-13 | Stop reason: HOSPADM

## 2019-06-13 RX ORDER — PANTOPRAZOLE SODIUM 40 MG/1
40 TABLET, DELAYED RELEASE ORAL
Qty: 30 TABLET | Refills: 0 | Status: SHIPPED | OUTPATIENT
Start: 2019-06-14 | End: 2019-07-23 | Stop reason: SDUPTHER

## 2019-06-13 RX ORDER — GLIMEPIRIDE 1 MG/1
1 TABLET ORAL
Qty: 90 TABLET | Refills: 0 | Status: SHIPPED | OUTPATIENT
Start: 2019-06-13 | End: 2019-06-28 | Stop reason: DRUGHIGH

## 2019-06-13 RX ORDER — GLIMEPIRIDE 1 MG/1
1 TABLET ORAL
Status: DISCONTINUED | OUTPATIENT
Start: 2019-06-13 | End: 2019-06-13

## 2019-06-13 RX ORDER — GLIMEPIRIDE 1 MG/1
1 TABLET ORAL
Status: DISCONTINUED | OUTPATIENT
Start: 2019-06-13 | End: 2019-06-13 | Stop reason: HOSPADM

## 2019-06-13 RX ADMIN — INSULIN LISPRO 6 UNITS: 100 INJECTION, SOLUTION INTRAVENOUS; SUBCUTANEOUS at 12:14

## 2019-06-13 RX ADMIN — LISINOPRIL 20 MG: 20 TABLET ORAL at 08:36

## 2019-06-13 RX ADMIN — INSULIN LISPRO 6 UNITS: 100 INJECTION, SOLUTION INTRAVENOUS; SUBCUTANEOUS at 08:39

## 2019-06-13 RX ADMIN — GLIMEPIRIDE 1 MG: 1 TABLET ORAL at 12:13

## 2019-06-13 RX ADMIN — POTASSIUM CHLORIDE 40 MEQ: 20 TABLET, EXTENDED RELEASE ORAL at 08:36

## 2019-06-13 RX ADMIN — PANTOPRAZOLE SODIUM 40 MG: 40 TABLET, DELAYED RELEASE ORAL at 08:36

## 2019-06-13 RX ADMIN — METFORMIN HYDROCHLORIDE 500 MG: 500 TABLET ORAL at 08:36

## 2019-06-13 ASSESSMENT — PAIN SCALES - GENERAL
PAINLEVEL_OUTOF10: 0

## 2019-06-13 NOTE — DISCHARGE INSTR - DIET

## 2019-06-13 NOTE — DISCHARGE SUMMARY
Discharge Summary    Ibis Ko  :  1960  MRN:  [id number]    Admit date:  2019      Discharge date: 2019     Admitting Physician:  Catherine Jama MD    Consultants:  none    Procedures: none    Complications: none    Discharge Condition: stable    Hospital Course:   Ibis Ko is a 62 y.o. male admitted with 1-2 weeks of worsening nausea, epigastric abdominal pain, and vomiting. Patient also reported excessive thirst and increased urination during that time. He has PHM significant for tobacco abuse, 1pk/day for 40+ years. He has no other PMH as he has not seen a provider for a \"long time. 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 Father and brother had DM. No fever or chills. No ill exposures. Denied neuropathy symptoms. In ER serum glucose over 900. HbA1C 11.1. U/A negative ketones. Anion gap 20. Na 123, K 5.0, Cr 1.33. He was admitted for diabetic nonketotic hyperosmolar state/new onset DM to the ICU. Started on DKA protocol (same treatment as for diabetic nonketotic hyperosmolar state). Glucose improved and anion gap closed. Electrolytes replaced. Insulin gtt was stopped and patient was started on metformin bid and mealtime amaryl per Dr. Kim Washington. Patient BP was running high, thus lisinopril was added for his newly dx HTN. Now under better control. Patient was transferred to medical floor. Patient was seen by diabetic educator and dietician. Patient will monitor glucose at home and establish care with new PCP. Advised smoking cessation. Clinically stable. Discharge to home.      Exam:  GEN:  alert and oriented to person, place and time, well-developed and well-nourished, in no acute distress  EYES:  PERRL  NECK: normal, supple  PULM: clear to auscultation bilaterally - no wheezes, rales or rhonchi, normal air movement, no respiratory distress  COR:   regular rate & rhythm and no murmurs  ABD:    Obese, soft, non-tender, non-distended, normal bowel sounds, no masses or organomegaly  EXT:    no cyanosis, clubbing or edema present    NEURO: follows commands, ALEXIS, no deficits  SKIN:   no rashes or significant lesions    Significant Diagnostic Studies:   Lab Results   Component Value Date    WBC 8.2 06/13/2019    HGB 14.7 06/13/2019    PLT See Reflexed IPF Result 06/13/2019       Lab Results   Component Value Date    BUN 10 06/13/2019    CREATININE 0.78 06/13/2019     06/13/2019    K 4.2 06/13/2019    CALCIUM 8.0 (L) 06/13/2019     06/13/2019    CO2 22 06/13/2019    LABGLOM >60 06/13/2019       Lab Results   Component Value Date    WBCUA None 06/11/2019    RBCUA None 06/11/2019    EPITHUA 0 TO 2 06/11/2019    LEUKOCYTESUR NEGATIVE 06/11/2019    SPECGRAV <1.005 (L) 06/11/2019    GLUCOSEU 3+ (A) 06/11/2019    KETUA NEGATIVE 06/11/2019    PROTEINU NEGATIVE 06/11/2019    HGBUR NEGATIVE 06/11/2019    CASTUA NOT REPORTED 06/11/2019    CRYSTUA NOT REPORTED 06/11/2019    BACTERIA NOT REPORTED 06/11/2019    YEAST NOT REPORTED 06/11/2019       Xr Chest Standard (2 Vw)    Result Date: 6/11/2019  EXAMINATION: TWO XRAY VIEWS OF THE CHEST 6/11/2019 10:53 am COMPARISON: None. HISTORY: ORDERING SYSTEM PROVIDED HISTORY: rule out infection TECHNOLOGIST PROVIDED HISTORY: rule quot infection Acute symptoms. Initial encounter. FINDINGS: Lungs are clear. No pleural effusion or pneumothorax. Normal cardiomediastinal silhouette and pulmonary vascularity. No acute osseous abnormality. No acute cardiopulmonary disease. Ct Abdomen Pelvis W Iv Contrast Additional Contrast? None    Result Date: 6/11/2019  EXAMINATION: CT OF THE ABDOMEN AND PELVIS WITH CONTRAST 6/11/2019 10:44 am TECHNIQUE: CT of the abdomen and pelvis was performed with the administration of intravenous contrast. Multiplanar reformatted images are provided for review. Dose modulation, iterative reconstruction, and/or weight based adjustment of the mA/kV was utilized to reduce the radiation dose to as low as reasonably achievable. COMPARISON: None. HISTORY: ORDERING SYSTEM PROVIDED HISTORY: epigastric abd pain, bloating, vomiting x2 wks TECHNOLOGIST PROVIDED HISTORY: FINDINGS: Lower Chest: No acute abnormality noted in the included lung bases and heart base. No effusions. Organs: Hepatomegaly with hepatic steatosis is evident. Spleen, pancreas, gallbladder, adrenals, and kidneys are unremarkable with the exception of a 12 mm right hepatic cyst. GI/Bowel: No free fluid, free air, bowel obstruction, or bowel wall thickening is noted. Fat-containing periumbilical hernia of 2.3 cm without strangulation is evident. Sigmoid diverticulosis, moderate, without evidence of acute diverticulitis is noted. Pelvis: No abnormal fluid collection, pelvic or inguinal adenopathy is noted. Left inguinal ring is dilated and fat containing without strangulation. As previously noted, moderate sigmoid diverticulosis without acute diverticulitis is noted. Bladder is unremarkable. Peritoneum/Retroperitoneum: No aortic aneurysm, retroperitoneal mass, retroperitoneal or mesenteric adenopathy is seen. Bones/Soft Tissues: No acute osseous or soft tissue abnormality. Multilevel degenerative changes in the spine. Estimated biologic radiation dose for this procedure:1027.12 mGy/cm2.     1.  Hepatomegaly and hepatic steatosis without space-occupying mass lesion. 2.  Sigmoid diverticulosis without acute diverticulitis. 3.  Periumbilical and left inguinal ring fat containing hernias without strangulation. 4.  Other incidental findings as above. There is no bowel obstruction, gallstones, or appendicitis. Discharge Diagnoses:    Principal Problem:    Diabetic hyperosmolar non-ketotic state ([address]  Active Problems:    Diabetes mellitus, new onset ([address]    DENA (acute kidney injury) ([address]    Tobacco abuse    Hypertension    Hypokalemia    Moderate malnutrition (HCC)    Lactic acidosis  Resolved Problems:    * No resolved hospital problems.  [facility] Problems    Diagnosis Date Noted    Lactic acidosis [E87.2] 06/13/2019    Hypertension [I10] 06/12/2019    Hypokalemia [E87.6] 06/12/2019    Moderate malnutrition ([facility] [E44.0] 06/12/2019    Diabetic hyperosmolar non-ketotic state ([facility] [E11.00] 06/11/2019    Diabetes mellitus, new onset ([facility] [E11.9] 06/11/2019    DENA (acute kidney injury) ([facility] [N17.9] 06/11/2019    Tobacco abuse [Z72.0]        Discharge Medications:       [facility] Medication Instructions JATIN:[id number]    Printed on:06/13/19 1003   Medication Information                      albuterol (PROVENTIL HFA) 108 (90 BASE) MCG/ACT inhaler  Inhale 2 puffs into the lungs every 6 hours as needed for Wheezing. blood glucose monitor strips  Test 4 times a day & as needed for symptoms of irregular blood glucose. glimepiride (AMARYL) 1 MG tablet  Take 1 tablet by mouth 3 times daily (before meals)             Lancet Device MISC  1 Device by Does not apply route once for 1 dose             lidocaine viscous (XYLOCAINE) 2 % solution  15 ML throat gargle every 4 hours as needed for throat pain. lisinopril (PRINIVIL;ZESTRIL) 20 MG tablet  Take 1 tablet by mouth daily             metFORMIN (GLUCOPHAGE) 500 MG tablet  Take 1 tablet by mouth 2 times daily (with meals)             pantoprazole (PROTONIX) 40 MG tablet  Take 1 tablet by mouth every morning (before breakfast)                 Patient Instructions:    Activity: activity as tolerated  Diet: diabetic diet  Wound Care: none needed  Other: finger sticks     Disposition:   Discharge to Home    Follow up:  Patient will be followed by new PCP in 1-2 weeks    CORE MEASURES on Discharge (if applicable)  ACE/ARB in CHF: NA  Statin in MI: NA  ASA in MI: NA  Statin in CVA: NA  Antiplatelet in CVA: NA    Total time spent on discharge services: 35 minutes    Including the following activities:  Evaluation and Management of patient  Discussion with patient and/or surrogate about current care plan  Coordination with Case Management and/or   Coordination of care with Consultants (if applicable)   Coordination of care with Receiving Facility Physician (if applicable)  Completion of DME forms (if applicable)  Preparation of Discharge Summary  Preparation of Medication Reconciliation  Preparation of Discharge Prescriptions    Signed:  Jayant Garcia PA-C  6/13/2019, 10:03 AM

## 2019-06-13 NOTE — PROGRESS NOTES
[facility] SPECIALTY [facility]  OCCUPATIONAL THERAPY  No Visit Note    [] ICU    [x] Acute   Patient: Suki Ramos  Room: 7864/3590-09      Suki Ramos not seen on 6/13/2019 at 8:27 AM due to this OT spoke with pt. Pt with no concerns regarding self-care/ADL. Pt reports that he is independent at home. Ambulated around room and bathroom with IV pole with no LOB noted. No further OT indicated at this time.         Signature: Hermes Nino, LYNNETTE, OTR/L

## 2019-06-13 NOTE — PROGRESS NOTES
PERRL  NECK: normal, supple  PULM: clear to auscultation bilaterally - no wheezes, rales or rhonchi, normal air movement, no respiratory distress  COR:   regular rate & rhythm and no murmurs  ABD:    Obese, soft, non-tender, non-distended, normal bowel sounds, no masses or organomegaly  EXT:    no cyanosis, clubbing or edema present    NEURO: follows commands, ALEXIS, no deficits  SKIN:   no rashes or significant lesions    -----------------------------------------------------------------  Diagnostic Data:  Lab Results   Component Value Date    WBC 8.2 06/13/2019    HGB 14.7 06/13/2019    HCT 42.1 06/13/2019    PLT See Reflexed IPF Result 06/13/2019    ALT 54 (H) 06/11/2019    AST 38 06/11/2019     06/13/2019    K 4.2 06/13/2019     06/13/2019    CREATININE 0.78 06/13/2019    BUN 10 06/13/2019    CO2 22 06/13/2019    LABA1C 11.1 (H) 06/11/2019       ASSESSMENT:    Principal Problem:    Diabetic hyperosmolar non-ketotic state ([address]  Active Problems:    Diabetes mellitus, new onset ([address]    DENA (acute kidney injury) (HCC)    Tobacco abuse    Hypertension    Hypokalemia    Moderate malnutrition (HCC)    Lactic acidosis  Resolved Problems:    * No resolved hospital problems.  *      Patient Active Problem List    Diagnosis Date Noted    Lactic acidosis 06/13/2019    Hypertension 06/12/2019    Hypokalemia 06/12/2019    Moderate malnutrition ([address] 06/12/2019    Diabetic hyperosmolar non-ketotic state ([address] 06/11/2019    Diabetes mellitus, new onset ([address] 06/11/2019    DENA (acute kidney [facility]) [facility] 06/11/2019    Tobacco abuse        PLAN:    Diabetic hyperosmolar non-ketotic state    resolved    Diabetes mellitus, new onset    amaryl increased   Metformin   Finger sticks qac and qhs   Diabetic education    DENA   resolved    Tobacco abuse   cessation    Hypertension   Continue lisinopril    Hypokalemia - resolved    Discharge to HOME     · High risk medication: none    EFREN Jaramillo PA-C  6/13/2019, 9:58 AM

## 2019-06-13 NOTE — PROGRESS NOTES
[facility]  Inpatient/Observation/Outpatient Rehabilitation    Date: 2019  Patient Name: Juanita Park       [x] Inpatient Acute/Observation       []  Outpatient  : 1960       [] Pt no showed for scheduled appointment    [] Pt refused/declined therapy at this time due to:           [x] Pt cancelled due to:  [] No Reason Given   [] Sick/ill   [] Other: Pt. Not seen due to discharge summaries being submitted this morning.       Jessa Land, PT, DPT Date: 2019

## 2019-06-13 NOTE — PROGRESS NOTES
Discharge instructions given to pt and wife. All questions answered. Pt aware of follow up appointments as listed on AVS. Pt d/c'd off unit at this time, ambulatory with spouse to home. Belongings in hand. No issues noted.

## 2019-06-28 ENCOUNTER — OFFICE VISIT (OUTPATIENT)
Dept: PRIMARY CARE CLINIC | Age: 59
End: 2019-06-28
Payer: MEDICAID

## 2019-06-28 VITALS
TEMPERATURE: 97.5 F | BODY MASS INDEX: 36.82 KG/M2 | HEART RATE: 108 BPM | WEIGHT: 229.1 LBS | RESPIRATION RATE: 18 BRPM | HEIGHT: 66 IN | DIASTOLIC BLOOD PRESSURE: 81 MMHG | SYSTOLIC BLOOD PRESSURE: 129 MMHG

## 2019-06-28 DIAGNOSIS — Z12.11 SCREENING FOR COLORECTAL CANCER: Primary | ICD-10-CM

## 2019-06-28 DIAGNOSIS — E11.9 DIABETES MELLITUS, NEW ONSET (HCC): Primary | ICD-10-CM

## 2019-06-28 DIAGNOSIS — Z76.89 ENCOUNTER TO ESTABLISH CARE WITH NEW DOCTOR: ICD-10-CM

## 2019-06-28 DIAGNOSIS — Z12.12 SCREENING FOR COLORECTAL CANCER: Primary | ICD-10-CM

## 2019-06-28 DIAGNOSIS — I10 ESSENTIAL HYPERTENSION: ICD-10-CM

## 2019-06-28 PROCEDURE — 99203 OFFICE O/P NEW LOW 30 MIN: CPT | Performed by: NURSE PRACTITIONER

## 2019-06-28 RX ORDER — GLIMEPIRIDE 1 MG/1
1.5 TABLET ORAL
Qty: 90 TABLET | Refills: 0 | Status: SHIPPED
Start: 2019-06-28 | End: 2019-07-29 | Stop reason: SDUPTHER

## 2019-06-28 ASSESSMENT — ENCOUNTER SYMPTOMS
WHEEZING: 0
NAUSEA: 0
VOMITING: 0
DIARRHEA: 0
SHORTNESS OF BREATH: 0
VISUAL CHANGE: 0
CONSTIPATION: 0
COUGH: 0
SORE THROAT: 0
RHINORRHEA: 0

## 2019-06-28 ASSESSMENT — PATIENT HEALTH QUESTIONNAIRE - PHQ9
1. LITTLE INTEREST OR PLEASURE IN DOING THINGS: 0
2. FEELING DOWN, DEPRESSED OR HOPELESS: 0
SUM OF ALL RESPONSES TO PHQ9 QUESTIONS 1 & 2: 0
SUM OF ALL RESPONSES TO PHQ QUESTIONS 1-9: 0
SUM OF ALL RESPONSES TO PHQ QUESTIONS 1-9: 0

## 2019-06-28 NOTE — PROGRESS NOTES
Name: Rao Cooper  : 1960         Chief Complaint:     Chief Complaint   Patient presents with    Establish Care     New patient. States \"I was in the hospital because I  was drinking a peeing alot and found out I am diabetic. \"     Diabetes       History of Present Illness:      Rao Cooper is a 61 y.o.  male who presents with Establish Care (New patient. States \"I was in the hospital because I  was drinking a peeing alot and found out I am diabetic. \" ) and Diabetes      Joleen Ingram is here today to establish care. Patient was recently discharged from the hospital with new onset diabetes type 2. Patient has seen diabetic education and continues medication prescribed while in the hospital.    Hospital Course:   Rao Cooper is a 62 y.o. male admitted with 1-2 weeks of worsening nausea, epigastric abdominal pain, and vomiting. Patient also reported excessive thirst and increased urination during that time. He has PHM significant for tobacco abuse, 1pk/day for 40+ years. He has no other PMH as he has not seen a provider for a \"long time. 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 Father and brother had DM. No fever or chills. No ill exposures. Denied neuropathy symptoms. In ER serum glucose over 900. HbA1C 11.1. U/A negative ketones. Anion gap 20. Na 123, K 5.0, Cr 1.33. He was admitted for diabetic nonketotic hyperosmolar state/new onset DM to the ICU. Started on DKA protocol (same treatment as for diabetic nonketotic hyperosmolar state). Glucose improved and anion gap closed. Electrolytes replaced. Insulin gtt was stopped and patient was started on metformin bid and mealtime amaryl per Dr. Martha Hoffman. Patient BP was running high, thus lisinopril was added for his newly dx HTN. Now under better control. Patient was transferred to medical floor. Patient was seen by diabetic educator and dietician. Patient will monitor glucose at home and establish care with new PCP. Advised smoking cessation. Clinically stable. Discharge to home. Diabetes   He presents for his initial diabetic visit. He has type 2 diabetes mellitus. His disease course has been improving. Hypoglycemia symptoms include dizziness and sweats. Pertinent negatives for hypoglycemia include no headaches. There are no diabetic associated symptoms. Pertinent negatives for diabetes include no chest pain, no fatigue, no foot paresthesias, no polydipsia, no polyphagia, no polyuria and no visual change. There are no hypoglycemic complications. Symptoms are stable. Pertinent negatives for diabetic complications include no CVA, heart disease, nephropathy or peripheral neuropathy. Risk factors for coronary artery disease include diabetes mellitus, male sex, obesity, tobacco exposure, family history, stress and hypertension. Current diabetic treatment includes oral agent (dual therapy). He is compliant with treatment all of the time. His weight is decreasing steadily. He is following a generally healthy diet. When asked about meal planning, he reported none. He has had a previous visit with a dietitian. He rarely participates in exercise. His home blood glucose trend is decreasing steadily. His breakfast blood glucose range is generally 140-180 mg/dl. An ACE inhibitor/angiotensin II receptor blocker is being taken. He does not see a podiatrist.Eye exam is not current. Hypertension   This is a recurrent problem. The current episode started more than 1 month ago. The problem has been gradually improving since onset. The problem is controlled. Associated symptoms include sweats. Pertinent negatives include no chest pain, headaches, palpitations or shortness of breath. There are no associated agents to hypertension. Risk factors for coronary artery disease include diabetes mellitus, family history, obesity, male gender, stress and smoking/tobacco exposure. Past treatments include ACE inhibitors. The current treatment provides moderate improvement.  Compliance problems include diet and exercise. There is no history of kidney disease, CAD/MI, CVA or heart failure. There is no history of chronic renal disease. Past Medical History:     Past Medical History:   Diagnosis Date    Diabetes mellitus ([address]     Tobacco abuse       Reviewed all health maintenance requirements and ordered appropriate tests  Health Maintenance Due   Topic Date Due    Lipid screen  06/22/1970    Diabetic microalbuminuria test  06/22/1978    Colon cancer screen colonoscopy  06/22/2010       Past Surgical History:     Past Surgical History:   Procedure Laterality Date    APPENDECTOMY          Medications:       Prior to Admission medications    Medication Sig Start Date End Date Taking? Authorizing Provider   glimepiride (AMARYL) 1 MG tablet Take 1.5 tablets by mouth every morning (before breakfast) 6/28/19  Yes NURY Geller CNP   metFORMIN (GLUCOPHAGE) 500 MG tablet Take 2 tablets by mouth 2 times daily (with meals) 6/28/19  Yes NURY Geller CNP   lisinopril (PRINIVIL;ZESTRIL) 20 MG tablet Take 1 tablet by mouth daily 6/14/19  Yes Keo Guadarrama PA-C   pantoprazole (PROTONIX) 40 MG tablet Take 1 tablet by mouth every morning (before breakfast) 6/14/19  Yes Mellissa Guadarrama PA-C   Lancet Device MISC 1 Device by Does not apply route once for 1 dose 6/11/19 6/11/19  Felix Guadarrama PA-C   blood glucose monitor strips Test 4 times a day & as needed for symptoms of irregular blood glucose. 6/11/19   Mellissa Guadarrama PA-C        Allergies:       Patient has no known allergies. Social History:     Tobacco:    reports that he has been smoking cigarettes. He has a 40.00 pack-year smoking history. He has never used smokeless tobacco.  Alcohol:      reports that he does not drink alcohol. Drug Use:  reports that he does not use drugs.     Family History:     Family History   Problem Relation Age of Onset    Diabetes Father     Diabetes Brother        Review of Systems: 06/13/2019    PROT 8.7 06/11/2019    LABALBU 4.2 06/11/2019    BILITOT 0.78 06/11/2019    ALKPHOS 284 06/11/2019    AST 38 06/11/2019    ALT 54 06/11/2019     Lab Results   Component Value Date    WBC 8.2 06/13/2019    RBC 4.66 06/13/2019    HGB 14.7 06/13/2019    HCT 42.1 06/13/2019    MCV 90.3 06/13/2019    MCH 31.5 06/13/2019    MCHC 34.9 06/13/2019    RDW 12.4 06/13/2019    PLT See Reflexed IPF Result 06/13/2019    MPV NOT REPORTED 06/13/2019     No results found for: TSH  Lab Results   Component Value Date    LABA1C 11.1 06/11/2019       Assessment/Plan:      Diagnosis Orders   1. Diabetes mellitus, new onset ([facility] [address]  Lipid Panel    Microalbumin, Ur   2. Essential hypertension     3. Encounter to establish care with new doctor       Patient was discharged from the hospital with instructions to take glimepiride 1 mg with meals. Patient is experiencing some hypoglycemia and has not been taking as prescribed. We will change this to 1.5 mg with breakfast only. He will increase his metformin to 1000 mg twice daily with meals. He will only check fasting glucoses for the next week and call results. He did meet with diabetic education and was advised on carb intake. However I instructed him to eat less carbs than recommended. 1.  Jin received counseling on the following healthy behaviors: nutrition, exercise and medication adherence  2. Patient given educational materials - see patient instructions  3. Was a self-tracking handout given in paper form or via [employer]? No  If yes, see orders or list here. 4.  Discussed use, benefit, and side effects of prescribed medications. Barriers to medication compliance addressed. All patient questions answered. Pt voiced understanding. 5.  Reviewed prior labs and health maintenance  6. Continue current medications, diet and exercise.     Completed Refills   Requested Prescriptions     Signed Prescriptions Disp Refills    glimepiride (AMARYL) 1 MG tablet 90 tablet 0 Sig: Take 1.5 tablets by mouth every morning (before breakfast)    metFORMIN (GLUCOPHAGE) 500 MG tablet 60 tablet 0     Sig: Take 2 tablets by mouth 2 times daily (with meals)         Return in about 1 month (around 7/26/2019) for recheck up.

## 2019-06-28 NOTE — PATIENT INSTRUCTIONS
SURVEY:    You may be receiving a survey from [employer] regarding your visit today. Please complete the survey to enable us to provide the highest quality of care to you and your family. If you cannot score us a very good on any question, please call the office to discuss how we could have made your experience a very good one. Thank you. Patient Education        Counting Carbohydrates: Care Instructions  Your Care Instructions    You don't have to eat special foods when you have diabetes. You just have to be careful to eat healthy foods. Carbohydrates (carbs) raise blood sugar higher and quicker than any other nutrient. Carbs are found in desserts, breads and cereals, and fruit. They're also in starchy vegetables. These include potatoes, corn, and grains such as rice and pasta. Carbs are also in milk and yogurt. The more carbs you eat at one time, the higher your blood sugar will rise. Spreading carbs all through the day helps keep your blood sugar levels within your target range. Counting carbs is one of the best ways to keep your blood sugar under control. If you use insulin, counting carbs helps you match the right amount of insulin to the number of grams of carbs in a meal. Then you can change your diet and insulin dose as needed. Testing your blood sugar several times a day can help you learn how carbs affect your blood sugar. A registered dietitian or certified diabetes educator can help you plan meals and snacks. Follow-up care is a key part of your treatment and safety. Be sure to make and go to all appointments, and call your doctor if you are having problems. It's also a good idea to know your test results and keep a list of the medicines you take. How can you care for yourself at home?   Know your daily amount of carbohydrates  Your daily amount depends on several things, such as your weight, how active you are, which diabetes medicines you take, and what your goals are for your blood sugar levels. A registered dietitian or certified diabetes educator can help you plan how many carbs to include in each meal and snack. For most adults, a guideline for the daily amount of carbs is:  · 45 to 60 grams at each meal. That's about the same as 3 to 4 carbohydrate servings. · 15 to 20 grams at each snack. That's about the same as 1 carbohydrate serving. Count carbs  Counting carbs lets you know how much rapid-acting insulin to take before you eat. If you use an insulin pump, you get a constant rate of insulin during the day. So the pump must be programmed at meals. This gives you extra insulin to cover the rise in blood sugar after meals. If you take insulin:  · Learn your own insulin-to-carb ratio. You and your diabetes health professional will figure out the ratio. You can do this by testing your blood sugar after meals. For example, you may need a certain amount of insulin for every 15 grams of carbs. · Add up the carb grams in a meal. Then you can figure out how many units of insulin to take based on your insulin-to-carb ratio. · Exercise lowers blood sugar. You can use less insulin than you would if you were not doing exercise. Keep in mind that timing matters. If you exercise within 1 hour after a meal, your body may need less insulin for that meal than it would if you exercised 3 hours after the meal. Test your blood sugar to find out how exercise affects your need for insulin. If you do or don't take insulin:  · Look at labels on packaged foods. This can tell you how many carbs are in a serving. You can also use guides from the American Diabetes Association. · Be aware of portions, or serving sizes. If a package has two servings and you eat the whole package, you need to double the number of grams of carbohydrate listed for one serving. · Protein, fat, and fiber do not raise blood sugar as much as carbs do.  If you eat a lot of these nutrients in a meal, your blood sugar will rise more slowly than it would otherwise. Eat from all food groups  · Eat at least three meals a day. · Plan meals to include food from all the food groups. The food groups include grains, fruits, dairy, proteins, and vegetables. · Talk to your dietitian or diabetes educator about ways to add limited amounts of sweets into your meal plan. · If you drink alcohol, talk to your doctor. It may not be recommended when you are taking certain diabetes medicines. Where can you learn more? Go to https://[employer].Where I've Been. org and sign in to your [employer] account. Enter H455 in the [employer] box to learn more about \"Counting Carbohydrates: Care Instructions. \"     If you do not have an account, please click on the \"Sign Up Now\" link. Current as of: July 25, 2018  Content Version: 12.0  © 7698-2732 Healthwise, Incorporated. Care instructions adapted under license by [facility] ([facility]). If you have questions about a medical condition or this instruction, always ask your healthcare professional. [place] [address] any warranty or liability for your use of this information.

## 2019-07-08 ENCOUNTER — TELEPHONE (OUTPATIENT)
Dept: PRIMARY CARE CLINIC | Age: 59
End: 2019-07-08

## 2019-07-08 DIAGNOSIS — E11.9 DIABETES MELLITUS, NEW ONSET (HCC): Primary | ICD-10-CM

## 2019-07-08 DIAGNOSIS — I10 ESSENTIAL HYPERTENSION: ICD-10-CM

## 2019-07-08 RX ORDER — LISINOPRIL 20 MG/1
20 TABLET ORAL DAILY
Qty: 90 TABLET | Refills: 1 | Status: SHIPPED
Start: 2019-07-08 | End: 2020-09-14 | Stop reason: SINTOL

## 2019-07-08 NOTE — TELEPHONE ENCOUNTER
Phone call from wife requesting a refill of Lisinopril and Metformin be sent to [facility] ED.     Health Maintenance   Topic Date Due    Lipid screen  06/22/1970    Diabetic microalbuminuria test  06/22/1978    Colon cancer screen colonoscopy  06/22/2010    DTaP/Tdap/Td vaccine (1 - Tdap) 07/19/2019 (Originally 6/22/1979)    Diabetic foot exam  07/28/2019 (Originally 6/22/1970)    Diabetic retinal exam  06/28/2020 (Originally 6/22/1970)    Hepatitis B Vaccine (1 of 3 - Risk 3-dose series) 06/28/2020 (Originally 6/22/1979)    Shingles Vaccine (1 of 2) 06/28/2020 (Originally 6/22/2010)    Pneumococcal 0-64 years Vaccine (1 of 1 - PPSV23) 06/28/2020 (Originally 6/22/1966)    Hepatitis C screen  06/28/2020 (Originally 1960)    HIV screen  06/28/2020 (Originally 6/22/1975)    Flu vaccine (1) 09/01/2019    A1C test (Diabetic or Prediabetic)  09/11/2019    Potassium monitoring  06/13/2020    Creatinine monitoring  06/13/2020    Low dose CT lung screening  Discontinued             (applicable per patient's age: Cancer Screenings, Depression Screening, Fall Risk Screening, Immunizations)    Hemoglobin A1C (%)   Date Value   06/11/2019 11.1 (H)     AST (U/L)   Date Value   06/11/2019 38     ALT (U/L)   Date Value   06/11/2019 54 (H)     BUN (mg/dL)   Date Value   06/13/2019 10      (goal A1C is < 7)   (goal LDL is <100) need 30-50% reduction from baseline     BP Readings from Last 3 Encounters:   06/28/19 129/81   06/13/19 134/74   01/22/18 (!) 138/90    (goal /80)      All Future Testing planned in [facility]:  Lab Frequency Next Occurrence   POCT Fecal Immunochemical Test (FIT) Once 07/12/2019   Lipid Panel Once 08/01/2019   Microalbumin, Ur Once 09/01/2019       Next Visit Date:  Future Appointments   Date Time Provider Elizabeth Davila   7/30/2019  9:20 AM Bhavani Corbin, APRN - CNP [facility]            Patient Active Problem List:     Diabetic hyperosmolar non-ketotic state ([facility] [address]     Diabetes

## 2019-07-15 ENCOUNTER — TELEPHONE (OUTPATIENT)
Dept: PRIMARY CARE CLINIC | Age: 59
End: 2019-07-15

## 2019-07-23 RX ORDER — PANTOPRAZOLE SODIUM 40 MG/1
40 TABLET, DELAYED RELEASE ORAL
Qty: 90 TABLET | Refills: 1 | Status: SHIPPED | OUTPATIENT
Start: 2019-07-23 | End: 2020-01-02

## 2019-07-24 ENCOUNTER — TELEPHONE (OUTPATIENT)
Dept: PRIMARY CARE CLINIC | Age: 59
End: 2019-07-24

## 2019-07-29 DIAGNOSIS — E11.9 DIABETES MELLITUS, NEW ONSET (HCC): Primary | ICD-10-CM

## 2019-07-29 RX ORDER — GLIMEPIRIDE 1 MG/1
1.5 TABLET ORAL
Qty: 135 TABLET | Refills: 1 | Status: SHIPPED | OUTPATIENT
Start: 2019-07-29 | End: 2020-03-17

## 2019-07-30 ENCOUNTER — TELEPHONE (OUTPATIENT)
Dept: PRIMARY CARE CLINIC | Age: 59
End: 2019-07-30

## 2019-07-30 NOTE — TELEPHONE ENCOUNTER
Attempted to call patient and message left regarding need to return FIT Test. Instructed to call this office with any questions.

## 2019-08-09 ENCOUNTER — TELEPHONE (OUTPATIENT)
Dept: PRIMARY CARE CLINIC | Age: 59
End: 2019-08-09

## 2019-08-24 ENCOUNTER — HOSPITAL ENCOUNTER (OUTPATIENT)
Age: 59
Setting detail: SPECIMEN
Discharge: HOME | End: 2019-08-24

## 2019-08-24 ENCOUNTER — HOSPITAL ENCOUNTER (OUTPATIENT)
Age: 59
Discharge: HOME | End: 2019-08-24

## 2019-08-24 DIAGNOSIS — E11.9 DIABETES MELLITUS, NEW ONSET (HCC): ICD-10-CM

## 2019-08-24 LAB
CHOLESTEROL/HDL RATIO: 3.7
CHOLESTEROL: 176 MG/DL
CREATININE URINE: 185.2 MG/DL (ref 39–259)
HDLC SERPL-MCNC: 47 MG/DL
LDL CHOLESTEROL: 93 MG/DL (ref 0–130)
MICROALBUMIN/CREAT 24H UR: <12 MG/L
MICROALBUMIN/CREAT UR-RTO: NORMAL MCG/MG CREAT
TRIGL SERPL-MCNC: 181 MG/DL
VLDLC SERPL CALC-MCNC: ABNORMAL MG/DL (ref 1–30)

## 2019-08-24 PROCEDURE — 82570 ASSAY OF URINE CREATININE: CPT

## 2019-08-24 PROCEDURE — 80061 LIPID PANEL: CPT

## 2019-08-24 PROCEDURE — 36415 COLL VENOUS BLD VENIPUNCTURE: CPT

## 2019-08-24 PROCEDURE — 82043 UR ALBUMIN QUANTITATIVE: CPT

## 2019-08-29 ENCOUNTER — OFFICE VISIT (OUTPATIENT)
Dept: PRIMARY CARE CLINIC | Age: 59
End: 2019-08-29

## 2019-08-29 VITALS
TEMPERATURE: 99.5 F | SYSTOLIC BLOOD PRESSURE: 108 MMHG | WEIGHT: 225.2 LBS | DIASTOLIC BLOOD PRESSURE: 71 MMHG | BODY MASS INDEX: 36.35 KG/M2 | HEART RATE: 114 BPM | RESPIRATION RATE: 20 BRPM

## 2019-08-29 DIAGNOSIS — S39.011A STRAIN OF ABDOMINAL WALL, INITIAL ENCOUNTER: ICD-10-CM

## 2019-08-29 DIAGNOSIS — E11.9 CONTROLLED TYPE 2 DIABETES MELLITUS WITHOUT COMPLICATION, WITHOUT LONG-TERM CURRENT USE OF INSULIN (HCC): Primary | ICD-10-CM

## 2019-08-29 DIAGNOSIS — K64.8 INTERNAL HEMORRHOID, BLEEDING: ICD-10-CM

## 2019-08-29 LAB — HBA1C MFR BLD: 5.9 %

## 2019-08-29 PROCEDURE — 99214 OFFICE O/P EST MOD 30 MIN: CPT | Performed by: NURSE PRACTITIONER

## 2019-08-29 PROCEDURE — 83036 HEMOGLOBIN GLYCOSYLATED A1C: CPT | Performed by: NURSE PRACTITIONER

## 2019-08-29 ASSESSMENT — ENCOUNTER SYMPTOMS
SORE THROAT: 0
HEMATOCHEZIA: 1
VOMITING: 0
NAUSEA: 0
DIARRHEA: 0
BELCHING: 0
RHINORRHEA: 0
ABDOMINAL PAIN: 1
CONSTIPATION: 0
BLURRED VISION: 0
VISUAL CHANGE: 0
FLATUS: 0

## 2019-08-29 ASSESSMENT — CROHNS DISEASE ACTIVITY INDEX (CDAI): CDAI SCORE: 0

## 2020-03-11 RX ORDER — SYRING-NEEDL,DISP,INSUL,0.3 ML 30 GX5/16"
1 SYRINGE, EMPTY DISPOSABLE MISCELLANEOUS ONCE
Qty: 100 DEVICE | Refills: 3 | Status: SHIPPED | OUTPATIENT
Start: 2020-03-11 | End: 2020-09-14

## 2020-03-11 RX ORDER — GLUCOSAMINE HCL/CHONDROITIN SU 500-400 MG
CAPSULE ORAL
Qty: 100 STRIP | Refills: 3 | Status: SHIPPED | OUTPATIENT
Start: 2020-03-11

## 2020-03-11 NOTE — TELEPHONE ENCOUNTER
Lancets and test strips  Refill to [facility], [id number] Final Maintenance   Topic Date Due    Diabetic foot exam  06/22/1970    Colon cancer screen colonoscopy  06/22/2010    Flu vaccine (1) 09/01/2019    Diabetic retinal exam  06/28/2020 (Originally 6/22/1970)    Hepatitis B vaccine (1 of 3 - Risk 3-dose series) 06/28/2020 (Originally 6/22/1979)    Shingles Vaccine (1 of 2) 06/28/2020 (Originally 6/22/2010)    Pneumococcal 0-64 years Vaccine (1 of 1 - PPSV23) 06/28/2020 (Originally 6/22/1966)    Hepatitis C screen  06/28/2020 (Originally 1960)    HIV screen  06/28/2020 (Originally 6/22/1975)    DTaP/Tdap/Td vaccine (1 - Tdap) 08/29/2020 (Originally 6/22/1979)    Potassium monitoring  06/13/2020    Creatinine monitoring  06/13/2020    Diabetic microalbuminuria test  08/24/2020    Lipid screen  08/24/2020    A1C test (Diabetic or Prediabetic)  08/29/2020    Hepatitis A vaccine  Aged Out    Hib vaccine  Aged Out    Meningococcal (ACWY) vaccine  Aged Out    Low dose CT lung screening  Discontinued             (applicable per patient's age: Cancer Screenings, Depression Screening, Fall Risk Screening, Immunizations)    Hemoglobin A1C (%)   Date Value   08/29/2019 5.9   06/11/2019 11.1 (H)     Microalb/Crt. Ratio (mcg/mg creat)   Date Value   08/24/2019 CANNOT BE CALCULATED     LDL Cholesterol (mg/dL)   Date Value   08/24/2019 93     AST (U/L)   Date Value   06/11/2019 38     ALT (U/L)   Date Value   06/11/2019 54 (H)     BUN (mg/dL)   Date Value   06/13/2019 10      (goal A1C is < 7)   (goal LDL is <100) need 30-50% reduction from baseline     BP Readings from Last 3 Encounters:   08/29/19 108/71   06/28/19 129/81   06/13/19 134/74    (goal /80)      All Future Testing planned in [facility]:  Lab Frequency Next Occurrence       Next Visit Date:  No future appointments.          Patient Active Problem List:     Diabetic hyperosmolar non-ketotic state ([facility]     Diabetes mellitus, new onset ([facility] [address]     DENA (acute kidney injury) ([facility] [address]     Tobacco abuse     Hypertension     Hypokalemia     Moderate malnutrition (HCC)     Lactic acidosis

## 2020-06-22 RX ORDER — PANTOPRAZOLE SODIUM 40 MG/1
TABLET, DELAYED RELEASE ORAL
Qty: 30 TABLET | Refills: 0 | Status: SHIPPED | OUTPATIENT
Start: 2020-06-22 | End: 2020-08-03 | Stop reason: SDUPTHER

## 2020-06-22 NOTE — TELEPHONE ENCOUNTER
Health Maintenance   Topic Date Due    Diabetic foot exam  06/22/1970    Colon cancer screen colonoscopy  06/22/2010    Potassium monitoring  06/13/2020    Creatinine monitoring  06/13/2020    Diabetic retinal exam  06/28/2020 (Originally 6/22/1970)    Shingles Vaccine (1 of 2) 06/28/2020 (Originally 6/22/2010)    Pneumococcal 0-64 years Vaccine (1 of 1 - PPSV23) 06/28/2020 (Originally 6/22/1966)    Hepatitis C screen  06/28/2020 (Originally 1960)    HIV screen  06/28/2020 (Originally 6/22/1975)    DTaP/Tdap/Td vaccine (1 - Tdap) 08/29/2020 (Originally 6/22/1979)    Diabetic microalbuminuria test  08/24/2020    Lipid screen  08/24/2020    A1C test (Diabetic or Prediabetic)  08/29/2020    Flu vaccine (Season Ended) 09/01/2020    Hepatitis A vaccine  Aged Out    Hib vaccine  Aged Out    Meningococcal (ACWY) vaccine  Aged Out    Low dose CT lung screening  Discontinued             (applicable per patient's age: Cancer Screenings, Depression Screening, Fall Risk Screening, Immunizations)    Hemoglobin A1C (%)   Date Value   08/29/2019 5.9   06/11/2019 11.1 (H)     Microalb/Crt. Ratio (mcg/mg creat)   Date Value   08/24/2019 CANNOT BE CALCULATED     LDL Cholesterol (mg/dL)   Date Value   08/24/2019 93     AST (U/L)   Date Value   06/11/2019 38     ALT (U/L)   Date Value   06/11/2019 54 (H)     BUN (mg/dL)   Date Value   06/13/2019 10      (goal A1C is < 7)   (goal LDL is <100) need 30-50% reduction from baseline     BP Readings from Last 3 Encounters:   08/29/19 108/71   06/28/19 129/81   06/13/19 134/74    (goal /80)      All Future Testing planned in [facility]:  Lab Frequency Next Occurrence       Next Visit Date:  No future appointments.          Patient Active Problem List:     Diabetic hyperosmolar non-ketotic state ([address]     Diabetes mellitus, new onset ([address]     DENA (acute kidney injury) ([address]     Tobacco abuse     Hypertension     Hypokalemia     Moderate malnutrition (HCC)     Lactic acidosis

## 2020-08-03 RX ORDER — PANTOPRAZOLE SODIUM 40 MG/1
40 TABLET, DELAYED RELEASE ORAL DAILY
Qty: 30 TABLET | Refills: 0 | Status: SHIPPED | OUTPATIENT
Start: 2020-08-03 | End: 2020-09-14 | Stop reason: SDUPTHER

## 2020-08-03 NOTE — TELEPHONE ENCOUNTER
Will refill medications for 1 month. He needs to have an appointment for further refills.   Thank you

## 2020-09-14 ENCOUNTER — OFFICE VISIT (OUTPATIENT)
Dept: PRIMARY CARE CLINIC | Age: 60
End: 2020-09-14

## 2020-09-14 VITALS
BODY MASS INDEX: 39.13 KG/M2 | WEIGHT: 243.5 LBS | RESPIRATION RATE: 18 BRPM | HEART RATE: 99 BPM | DIASTOLIC BLOOD PRESSURE: 84 MMHG | HEIGHT: 66 IN | SYSTOLIC BLOOD PRESSURE: 150 MMHG | TEMPERATURE: 97.6 F

## 2020-09-14 PROBLEM — N17.9 AKI (ACUTE KIDNEY INJURY) (HCC): Status: RESOLVED | Noted: 2019-06-11 | Resolved: 2020-09-14

## 2020-09-14 PROBLEM — E11.00 DIABETIC HYPEROSMOLAR NON-KETOTIC STATE (HCC): Status: RESOLVED | Noted: 2019-06-11 | Resolved: 2020-09-14

## 2020-09-14 PROBLEM — E87.20 LACTIC ACIDOSIS: Status: RESOLVED | Noted: 2019-06-13 | Resolved: 2020-09-14

## 2020-09-14 PROBLEM — E11.9 DIABETES MELLITUS, NEW ONSET (HCC): Status: RESOLVED | Noted: 2019-06-11 | Resolved: 2020-09-14

## 2020-09-14 PROBLEM — E44.0 MODERATE MALNUTRITION (HCC): Status: RESOLVED | Noted: 2019-06-12 | Resolved: 2020-09-14

## 2020-09-14 LAB — HBA1C MFR BLD: 7.4 %

## 2020-09-14 PROCEDURE — 99214 OFFICE O/P EST MOD 30 MIN: CPT | Performed by: NURSE PRACTITIONER

## 2020-09-14 PROCEDURE — 3051F HG A1C>EQUAL 7.0%<8.0%: CPT | Performed by: NURSE PRACTITIONER

## 2020-09-14 PROCEDURE — 83036 HEMOGLOBIN GLYCOSYLATED A1C: CPT | Performed by: NURSE PRACTITIONER

## 2020-09-14 RX ORDER — PANTOPRAZOLE SODIUM 40 MG/1
40 TABLET, DELAYED RELEASE ORAL DAILY
Qty: 90 TABLET | Refills: 3 | Status: SHIPPED | OUTPATIENT
Start: 2020-09-14 | End: 2022-07-18 | Stop reason: SDUPTHER

## 2020-09-14 RX ORDER — LOSARTAN POTASSIUM 50 MG/1
50 TABLET ORAL DAILY
Qty: 90 TABLET | Refills: 1 | Status: SHIPPED | OUTPATIENT
Start: 2020-09-14 | End: 2022-08-16 | Stop reason: SDUPTHER

## 2020-09-14 ASSESSMENT — PATIENT HEALTH QUESTIONNAIRE - PHQ9
2. FEELING DOWN, DEPRESSED OR HOPELESS: 0
SUM OF ALL RESPONSES TO PHQ QUESTIONS 1-9: 0
1. LITTLE INTEREST OR PLEASURE IN DOING THINGS: 0
SUM OF ALL RESPONSES TO PHQ9 QUESTIONS 1 & 2: 0
SUM OF ALL RESPONSES TO PHQ QUESTIONS 1-9: 0

## 2020-09-14 ASSESSMENT — ENCOUNTER SYMPTOMS
ABDOMINAL PAIN: 0
RHINORRHEA: 0
WHEEZING: 0
BLURRED VISION: 0
DIARRHEA: 0
VOMITING: 0
COUGH: 0
VISUAL CHANGE: 0
SHORTNESS OF BREATH: 0
SORE THROAT: 0
ORTHOPNEA: 0
NAUSEA: 0
CONSTIPATION: 0

## 2020-09-14 NOTE — PROGRESS NOTES
Name: Roxana Harris  : 1960         Chief Complaint:     Chief Complaint   Patient presents with    Diabetes     Routine office visit. \"Blood sugar today was 193. \"        History of Present Illness:      Roxana Harris is a 61 y.o.  male who presents with Diabetes (Routine office visit. \"Blood sugar today was 193. \" )      Yolanda Caputo is here today for a routine office visit. DM-worsening, A1c 7.4 in the office today. Patient admits to not adhering to diet and exercise at all. See below for further comment. Diabetes   He presents for his follow-up diabetic visit. He has type 2 diabetes mellitus. His disease course has been worsening. There are no hypoglycemic associated symptoms. Pertinent negatives for hypoglycemia include no dizziness, headaches, nervousness/anxiousness, sleepiness, speech difficulty or sweats. There are no diabetic associated symptoms. Pertinent negatives for diabetes include no blurred vision, no chest pain, no fatigue, no foot paresthesias, no foot ulcerations, no polydipsia, no polyphagia, no polyuria, no visual change, no weakness and no weight loss. There are no hypoglycemic complications. Pertinent negatives for hypoglycemia complications include no blackouts, no hospitalization and no nocturnal hypoglycemia. Symptoms are stable. Pertinent negatives for diabetic complications include no CVA, heart disease, nephropathy or peripheral neuropathy. Risk factors for coronary artery disease include diabetes mellitus, male sex, hypertension, stress, family history, obesity and tobacco exposure. Current diabetic treatment includes oral agent (dual therapy). He is compliant with treatment all of the time. His weight is stable. He is following a generally unhealthy diet. When asked about meal planning, he reported none. He has had a previous visit with a dietitian. He rarely participates in exercise. His home blood glucose trend is increasing steadily.  His breakfast blood glucose range is generally 140-180 mg/dl. An ACE inhibitor/angiotensin II receptor blocker is not being taken. He does not see a podiatrist.Eye exam is not current. Hypertension   This is a chronic problem. The current episode started more than 1 year ago. The problem has been gradually worsening since onset. The problem is uncontrolled. Pertinent negatives include no blurred vision, chest pain, headaches, neck pain, orthopnea, palpitations, peripheral edema, shortness of breath or sweats. There are no associated agents to hypertension. Risk factors for coronary artery disease include diabetes mellitus, family history, obesity, male gender, smoking/tobacco exposure and stress. Past treatments include ACE inhibitors. The current treatment provides moderate improvement. Compliance problems include exercise, diet and medication side effects. There is no history of kidney disease, CAD/MI, CVA or heart failure. There is no history of chronic renal disease. Past Medical History:     Past Medical History:   Diagnosis Date    Tobacco abuse       Reviewed all health maintenance requirements and ordered appropriate tests  Health Maintenance Due   Topic Date Due    Diabetic foot exam  06/22/1970    Colon cancer screen colonoscopy  06/22/2010    Potassium monitoring  06/13/2020    Creatinine monitoring  06/13/2020    Diabetic microalbuminuria test  08/24/2020    A1C test (Diabetic or Prediabetic)  08/29/2020    Flu vaccine (1) 09/01/2020    Lipid screen  08/24/2020       Past Surgical History:     Past Surgical History:   Procedure Laterality Date    APPENDECTOMY          Medications:       Prior to Admission medications    Medication Sig Start Date End Date Taking?  Authorizing Provider   pantoprazole (PROTONIX) 40 MG tablet Take 1 tablet by mouth daily 9/14/20  Yes NURY Guerrero - CNP   metFORMIN (GLUCOPHAGE) 1000 MG tablet Take 1 tablet by mouth 2 times daily (with meals) 9/14/20  Yes Senthil Corbin, APRN - CARLI Upper Arm, Position: Sitting, Cuff Size: Large Adult)   Pulse 99   Temp 97.6 °F (36.4 °C) (Temporal)   Resp 18   Ht 5' 6\" (1.676 m)   Wt 243 lb 8 oz (110.5 kg)   BMI 39.30 kg/m²     Physical Exam  Vitals signs and nursing note reviewed. Constitutional:       General: He is not in acute distress. Appearance: Normal appearance. He is obese. He is not ill-appearing. HENT:      Mouth/Throat:      Mouth: Mucous membranes are moist.   Eyes:      General: No scleral icterus. Conjunctiva/sclera: Conjunctivae normal.   Neck:      Musculoskeletal: Normal range of motion and neck supple. Cardiovascular:      Rate and Rhythm: Normal rate and regular rhythm. Heart sounds: No murmur. Pulmonary:      Effort: Pulmonary effort is normal.      Breath sounds: Normal breath sounds. Abdominal:      General: Bowel sounds are normal. There is no distension. Palpations: Abdomen is soft. Tenderness: There is no abdominal tenderness. Musculoskeletal:      Right lower leg: No edema. Left lower leg: No edema. Right foot: Normal range of motion. No deformity. Left foot: Normal range of motion. No deformity. Feet:      Right foot:      Protective Sensation: 5 sites tested. 5 sites sensed. Skin integrity: Warmth, callus and dry skin present. No ulcer, blister, skin breakdown, erythema or fissure. Toenail Condition: Right toenails are normal.      Left foot:      Protective Sensation: 5 sites tested. 5 sites sensed. Skin integrity: Warmth, callus and dry skin present. No ulcer, blister, skin breakdown, erythema or fissure. Toenail Condition: Left toenails are normal.   Lymphadenopathy:      Cervical: No cervical adenopathy. Skin:     General: Skin is warm and dry. Findings: No rash. Neurological:      Mental Status: He is alert and oriented to person, place, and time.    Psychiatric:         Mood and Affect: Mood normal.         Behavior: Behavior normal. Data:     Lab Results   Component Value Date     06/13/2019    K 4.2 06/13/2019     06/13/2019    CO2 22 06/13/2019    BUN 10 06/13/2019    CREATININE 0.78 06/13/2019    GLUCOSE 262 06/13/2019    PROT 8.7 06/11/2019    LABALBU 4.2 06/11/2019    BILITOT 0.78 06/11/2019    ALKPHOS 284 06/11/2019    AST 38 06/11/2019    ALT 54 06/11/2019     Lab Results   Component Value Date    WBC 8.2 06/13/2019    RBC 4.66 06/13/2019    HGB 14.7 06/13/2019    HCT 42.1 06/13/2019    MCV 90.3 06/13/2019    MCH 31.5 06/13/2019    MCHC 34.9 06/13/2019    RDW 12.4 06/13/2019    PLT See Reflexed IPF Result 06/13/2019    MPV NOT REPORTED 06/13/2019     No results found for: TSH  Lab Results   Component Value Date    CHOL 176 08/24/2019    HDL 47 08/24/2019    LABA1C 7.4 09/14/2020       Assessment/Plan:      Diagnosis Orders   1. Controlled type 2 diabetes mellitus without complication, without long-term current use of insulin (HCC)  POCT glycosylated hemoglobin (Hb A1C)    HM DIABETES FOOT EXAM    CBC Auto Differential    ALT    AST    Lipid Panel    Microalbumin, Ur    Basic Metabolic Panel    pantoprazole (PROTONIX) 40 MG tablet    metFORMIN (GLUCOPHAGE) 1000 MG tablet   2. Essential hypertension  losartan (COZAAR) 50 MG tablet   3. Screening for colorectal cancer  POCT Fecal Immunochemical Test (FIT)     Start losartan 50 mg daily. Recheck blood pressure in 2 weeks. Continue diabetic medication, patient asked to increase physical activity and pay attention to nutritional status. 1.  Jin received counseling on the following healthy behaviors: nutrition, exercise and medication adherence  2. Patient given educational materials - see patient instructions  3. Was a self-tracking handout given in paper form or via [employer]? No  If yes, see orders or list here. 4.  Discussed use, benefit, and side effects of prescribed medications. Barriers to medication compliance addressed.   All patient questions answered. Pt voiced understanding. 5.  Reviewed prior labs and health maintenance  6. Continue current medications, diet and exercise. Completed Refills   Requested Prescriptions     Signed Prescriptions Disp Refills    pantoprazole (PROTONIX) 40 MG tablet 90 tablet 3     Sig: Take 1 tablet by mouth daily    metFORMIN (GLUCOPHAGE) 1000 MG tablet 180 tablet 0     Sig: Take 1 tablet by mouth 2 times daily (with meals)    losartan (COZAAR) 50 MG tablet 90 tablet 1     Sig: Take 1 tablet by mouth daily         Return in about 6 months (around 3/14/2021) for Check up- A1c in office.

## 2020-09-22 ENCOUNTER — TELEPHONE (OUTPATIENT)
Dept: PRIMARY CARE CLINIC | Age: 60
End: 2020-09-22

## 2020-09-29 ENCOUNTER — TELEPHONE (OUTPATIENT)
Dept: PRIMARY CARE CLINIC | Age: 60
End: 2020-09-29

## 2020-10-07 ENCOUNTER — TELEPHONE (OUTPATIENT)
Dept: PRIMARY CARE CLINIC | Age: 60
End: 2020-10-07

## 2020-11-18 ENCOUNTER — TELEPHONE (OUTPATIENT)
Dept: PRIMARY CARE CLINIC | Age: 60
End: 2020-11-18

## 2020-11-18 NOTE — TELEPHONE ENCOUNTER
3rd reminder, attempted to reach pt. again to remind he still needs to complete fasting lab orders & FIT test. No answer & no VM available. Mail reminder letter? He has no upcoming appt. , please advise, thank you.

## 2021-02-18 ENCOUNTER — TELEPHONE (OUTPATIENT)
Dept: PRIMARY CARE CLINIC | Age: 61
End: 2021-02-18

## 2021-02-18 NOTE — TELEPHONE ENCOUNTER
Attempted to call patient to remind to have fasting labs done, no answer and unable to leave message- mailbox is full.

## 2021-03-08 ENCOUNTER — TELEPHONE (OUTPATIENT)
Dept: PRIMARY CARE CLINIC | Age: 61
End: 2021-03-08

## 2021-09-05 DIAGNOSIS — E11.9 CONTROLLED TYPE 2 DIABETES MELLITUS WITHOUT COMPLICATION, WITHOUT LONG-TERM CURRENT USE OF INSULIN (HCC): ICD-10-CM

## 2021-09-07 RX ORDER — PANTOPRAZOLE SODIUM 40 MG/1
TABLET, DELAYED RELEASE ORAL
Qty: 90 TABLET | Refills: 3 | OUTPATIENT
Start: 2021-09-07

## 2021-09-07 NOTE — TELEPHONE ENCOUNTER
Health Maintenance   Topic Date Due    Colon cancer screen colonoscopy  Never done    Potassium monitoring  06/13/2020    Creatinine monitoring  06/13/2020    Diabetic microalbuminuria test  08/24/2020    Lipid screen  08/24/2020    COVID-19 Vaccine (2 - Pfizer 2-dose series) 05/04/2021    Flu vaccine (1) Never done    Diabetic foot exam  09/14/2021    A1C test (Diabetic or Prediabetic)  09/14/2021    Diabetic retinal exam  09/14/2021 (Originally 6/22/1970)    DTaP/Tdap/Td vaccine (1 - Tdap) 09/14/2021 (Originally 6/22/1979)    Shingles Vaccine (1 of 2) 09/14/2021 (Originally 6/22/2010)    Pneumococcal 0-64 years Vaccine (1 of 2 - PPSV23) 09/14/2021 (Originally 6/22/1966)    Hepatitis C screen  09/14/2021 (Originally 1960)    HIV screen  09/14/2021 (Originally 6/22/1975)    Hepatitis A vaccine  Aged Out    Hepatitis B vaccine  Aged Out    Hib vaccine  Aged Out    Meningococcal (ACWY) vaccine  Aged Out    Low dose CT lung screening  Discontinued             (applicable per patient's age: Cancer Screenings, Depression Screening, Fall Risk Screening, Immunizations)    Hemoglobin A1C (%)   Date Value   09/14/2020 7.4   08/29/2019 5.9   06/11/2019 11.1 (H)     Microalb/Crt. Ratio (mcg/mg creat)   Date Value   08/24/2019 CANNOT BE CALCULATED     LDL Cholesterol (mg/dL)   Date Value   08/24/2019 93     AST (U/L)   Date Value   06/11/2019 38     ALT (U/L)   Date Value   06/11/2019 54 (H)     BUN (mg/dL)   Date Value   06/13/2019 10      (goal A1C is < 7)   (goal LDL is <100) need 30-50% reduction from baseline     BP Readings from Last 3 Encounters:   09/14/20 (!) 150/84   08/29/19 108/71   06/28/19 129/81    (goal /80)      All Future Testing planned in [facility]:  Lab Frequency Next Occurrence   POCT Fecal Immunochemical Test (FIT) Once 11/17/2020       Next Visit Date:  No future appointments.          Patient Active Problem List:     Tobacco abuse     Essential hypertension Hypokalemia

## 2021-12-04 DIAGNOSIS — E11.9 CONTROLLED TYPE 2 DIABETES MELLITUS WITHOUT COMPLICATION, WITHOUT LONG-TERM CURRENT USE OF INSULIN (HCC): ICD-10-CM

## 2021-12-06 NOTE — TELEPHONE ENCOUNTER
Pending medication. Health Maintenance   Topic Date Due    Hepatitis C screen  Never done    Pneumococcal 0-64 years Vaccine (1 of 2 - PPSV23) Never done    Diabetic retinal exam  Never done    HIV screen  Never done    DTaP/Tdap/Td vaccine (1 - Tdap) Never done    Colon cancer screen colonoscopy  Never done    Shingles Vaccine (1 of 2) Never done    Potassium monitoring  06/13/2020    Creatinine monitoring  06/13/2020    Diabetic microalbuminuria test  08/24/2020    Lipid screen  08/24/2020    COVID-19 Vaccine (2 - Pfizer 3-dose booster series) 05/04/2021    Flu vaccine (1) Never done    Diabetic foot exam  09/14/2021    A1C test (Diabetic or Prediabetic)  09/14/2021    Hepatitis A vaccine  Aged Out    Hepatitis B vaccine  Aged Out    Hib vaccine  Aged Out    Meningococcal (ACWY) vaccine  Aged Out             (applicable per patient's age: Cancer Screenings, Depression Screening, Fall Risk Screening, Immunizations)    Hemoglobin A1C (%)   Date Value   09/14/2020 7.4   08/29/2019 5.9   06/11/2019 11.1 (H)     Microalb/Crt. Ratio (mcg/mg creat)   Date Value   08/24/2019 CANNOT BE CALCULATED     LDL Cholesterol (mg/dL)   Date Value   08/24/2019 93     AST (U/L)   Date Value   06/11/2019 38     ALT (U/L)   Date Value   06/11/2019 54 (H)     BUN (mg/dL)   Date Value   06/13/2019 10      (goal A1C is < 7)   (goal LDL is <100) need 30-50% reduction from baseline     BP Readings from Last 3 Encounters:   09/14/20 (!) 150/84   08/29/19 108/71   06/28/19 129/81    (goal /80)      All Future Testing planned in [facility]:  Lab Frequency Next Occurrence       Next Visit Date:  No future appointments.          Patient Active Problem List:     Tobacco abuse     Essential hypertension     Hypokalemia

## 2022-01-04 DIAGNOSIS — E11.9 CONTROLLED TYPE 2 DIABETES MELLITUS WITHOUT COMPLICATION, WITHOUT LONG-TERM CURRENT USE OF INSULIN (HCC): Primary | ICD-10-CM

## 2022-01-24 ENCOUNTER — TELEPHONE (OUTPATIENT)
Dept: PRIMARY CARE CLINIC | Age: 62
End: 2022-01-24

## 2022-02-10 ENCOUNTER — TELEPHONE (OUTPATIENT)
Dept: PRIMARY CARE CLINIC | Age: 62
End: 2022-02-10

## 2022-02-10 NOTE — TELEPHONE ENCOUNTER
Attempted to contact patient in regards to open lab orders. Patient unable to contact due to voicemail full.

## 2022-03-04 ENCOUNTER — TELEPHONE (OUTPATIENT)
Dept: PRIMARY CARE CLINIC | Age: 62
End: 2022-03-04

## 2022-03-28 ENCOUNTER — TELEPHONE (OUTPATIENT)
Dept: PRIMARY CARE CLINIC | Age: 62
End: 2022-03-28

## 2022-03-28 NOTE — TELEPHONE ENCOUNTER
Attempted to call patient to remind to have labs done that Brady had ordered, no answer and voice mail is full. Will extend labs till end of year, no scheduled appts.

## 2022-07-18 DIAGNOSIS — E11.9 CONTROLLED TYPE 2 DIABETES MELLITUS WITHOUT COMPLICATION, WITHOUT LONG-TERM CURRENT USE OF INSULIN (HCC): ICD-10-CM

## 2022-07-18 DIAGNOSIS — E11.9 DIABETES MELLITUS, NEW ONSET (HCC): ICD-10-CM

## 2022-07-18 RX ORDER — GLIMEPIRIDE 1 MG/1
TABLET ORAL
Qty: 45 TABLET | Refills: 0 | Status: SHIPPED | OUTPATIENT
Start: 2022-07-18 | End: 2022-08-24

## 2022-07-18 RX ORDER — PANTOPRAZOLE SODIUM 40 MG/1
40 TABLET, DELAYED RELEASE ORAL DAILY
Qty: 30 TABLET | Refills: 0 | Status: SHIPPED | OUTPATIENT
Start: 2022-07-18 | End: 2022-08-16 | Stop reason: SDUPTHER

## 2022-07-18 RX ORDER — PANTOPRAZOLE SODIUM 40 MG/1
40 TABLET, DELAYED RELEASE ORAL DAILY
Qty: 90 TABLET | Refills: 3 | OUTPATIENT
Start: 2022-07-18

## 2022-07-18 NOTE — TELEPHONE ENCOUNTER
Patient requesting a short refill until his upcoming appointment. Patient is scheduled for 8/16/22. Health Maintenance   Topic Date Due    Pneumococcal 0-64 years Vaccine (1 - PCV) Never done    HIV screen  Never done    Hepatitis C screen  Never done    DTaP/Tdap/Td vaccine (1 - Tdap) Never done    Prostate Specific Antigen (PSA) Screening or Monitoring  Never done    Colorectal Cancer Screen  Never done    Shingles vaccine (1 of 2) Never done    A1C test (Diabetic or Prediabetic)  12/14/2020    COVID-19 Vaccine (2 - Pfizer series) 05/04/2021    Depression Screen  09/14/2021    Flu vaccine (1) 09/01/2022    Lipids  08/24/2024    Hepatitis A vaccine  Aged Out    Hepatitis B vaccine  Aged Out    Hib vaccine  Aged Out    Meningococcal (ACWY) vaccine  Aged Out             (applicable per patient's age: Cancer Screenings, Depression Screening, Fall Risk Screening, Immunizations)    Hemoglobin A1C (%)   Date Value   09/14/2020 7.4   08/29/2019 5.9   06/11/2019 11.1 (H)     Microalb/Crt.  Ratio (mcg/mg creat)   Date Value   08/24/2019 CANNOT BE CALCULATED     LDL Cholesterol (mg/dL)   Date Value   08/24/2019 93     AST (U/L)   Date Value   06/11/2019 38     ALT (U/L)   Date Value   06/11/2019 54 (H)     BUN (mg/dL)   Date Value   06/13/2019 10      (goal A1C is < 7)   (goal LDL is <100) need 30-50% reduction from baseline     BP Readings from Last 3 Encounters:   09/14/20 (!) 150/84   08/29/19 108/71   06/28/19 129/81    (goal /80)      All Future Testing planned in [facility]:  Lab Frequency Next Occurrence   CBC Auto Differential Once 12/31/2022   ALT Once 12/31/2022   AST Once 04/94/8127   Basic Metabolic Panel Once 25/12/7812   Hemoglobin A1C Once 12/31/2022   Lipid Panel Once 12/31/2022   Microalbumin, Ur Once 12/31/2022       Next Visit Date:  Future Appointments   Date Time Provider Elizabeth Davila   8/16/2022 11:20 AM Gissell Corbin, APRN - CNP [facility]            Patient Active Problem List: Tobacco abuse     Essential hypertension     Hypokalemia

## 2022-08-09 ENCOUNTER — TELEPHONE (OUTPATIENT)
Dept: PRIMARY CARE CLINIC | Age: 62
End: 2022-08-09

## 2022-08-16 ENCOUNTER — OFFICE VISIT (OUTPATIENT)
Dept: PRIMARY CARE CLINIC | Age: 62
End: 2022-08-16

## 2022-08-16 VITALS
BODY MASS INDEX: 38.09 KG/M2 | TEMPERATURE: 97.7 F | HEART RATE: 110 BPM | RESPIRATION RATE: 22 BRPM | OXYGEN SATURATION: 98 % | HEIGHT: 66 IN | DIASTOLIC BLOOD PRESSURE: 92 MMHG | SYSTOLIC BLOOD PRESSURE: 188 MMHG | WEIGHT: 237 LBS

## 2022-08-16 DIAGNOSIS — I10 ESSENTIAL HYPERTENSION: ICD-10-CM

## 2022-08-16 DIAGNOSIS — Z12.5 SCREENING PSA (PROSTATE SPECIFIC ANTIGEN): ICD-10-CM

## 2022-08-16 DIAGNOSIS — Z12.11 COLON CANCER SCREENING: ICD-10-CM

## 2022-08-16 DIAGNOSIS — E11.9 CONTROLLED TYPE 2 DIABETES MELLITUS WITHOUT COMPLICATION, WITHOUT LONG-TERM CURRENT USE OF INSULIN (HCC): ICD-10-CM

## 2022-08-16 DIAGNOSIS — E11.9 CONTROLLED TYPE 2 DIABETES MELLITUS WITHOUT COMPLICATION, WITHOUT LONG-TERM CURRENT USE OF INSULIN (HCC): Primary | ICD-10-CM

## 2022-08-16 LAB — HBA1C MFR BLD: 8.6 %

## 2022-08-16 PROCEDURE — 83036 HEMOGLOBIN GLYCOSYLATED A1C: CPT | Performed by: NURSE PRACTITIONER

## 2022-08-16 PROCEDURE — 99214 OFFICE O/P EST MOD 30 MIN: CPT | Performed by: NURSE PRACTITIONER

## 2022-08-16 RX ORDER — LOSARTAN POTASSIUM 100 MG/1
100 TABLET ORAL DAILY
Qty: 90 TABLET | Refills: 3 | Status: SHIPPED | OUTPATIENT
Start: 2022-08-16

## 2022-08-16 RX ORDER — PANTOPRAZOLE SODIUM 40 MG/1
40 TABLET, DELAYED RELEASE ORAL DAILY
Qty: 90 TABLET | Refills: 1 | Status: SHIPPED | OUTPATIENT
Start: 2022-08-16

## 2022-08-16 SDOH — ECONOMIC STABILITY: FOOD INSECURITY: WITHIN THE PAST 12 MONTHS, THE FOOD YOU BOUGHT JUST DIDN'T LAST AND YOU DIDN'T HAVE MONEY TO GET MORE.: PATIENT DECLINED

## 2022-08-16 SDOH — ECONOMIC STABILITY: FOOD INSECURITY: WITHIN THE PAST 12 MONTHS, YOU WORRIED THAT YOUR FOOD WOULD RUN OUT BEFORE YOU GOT MONEY TO BUY MORE.: PATIENT DECLINED

## 2022-08-16 ASSESSMENT — ENCOUNTER SYMPTOMS
VOMITING: 0
COUGH: 0
ORTHOPNEA: 0
BLURRED VISION: 0
SORE THROAT: 0
CONSTIPATION: 0
WHEEZING: 0
ABDOMINAL PAIN: 0
RHINORRHEA: 0
NAUSEA: 0
DIARRHEA: 0
VISUAL CHANGE: 0
SHORTNESS OF BREATH: 0

## 2022-08-16 ASSESSMENT — PATIENT HEALTH QUESTIONNAIRE - PHQ9
2. FEELING DOWN, DEPRESSED OR HOPELESS: 0
SUM OF ALL RESPONSES TO PHQ9 QUESTIONS 1 & 2: 0
SUM OF ALL RESPONSES TO PHQ QUESTIONS 1-9: 0
1. LITTLE INTEREST OR PLEASURE IN DOING THINGS: 0
SUM OF ALL RESPONSES TO PHQ QUESTIONS 1-9: 0

## 2022-08-16 ASSESSMENT — SOCIAL DETERMINANTS OF HEALTH (SDOH): HOW HARD IS IT FOR YOU TO PAY FOR THE VERY BASICS LIKE FOOD, HOUSING, MEDICAL CARE, AND HEATING?: PATIENT DECLINED

## 2022-08-16 NOTE — PROGRESS NOTES
Name: Mariaa Leon  : 1960         Chief Complaint:     Chief Complaint   Patient presents with    Diabetes     Routine check. Hypertension       History of Present Illness:      Mariaa Leon is a 58 y.o.  male who presents with Diabetes (Routine check.) and Hypertension      Lauren Patinocyndee is here today for a routine office visit. DM-worsening, patient not taking medication as directed. Not watching what he eats. See below for further comment. Hypertension-uncontrolled, patient not taking his medication. See below for further comment. Diabetes  He presents for his follow-up diabetic visit. He has type 2 diabetes mellitus. Onset time: YEARS. His disease course has been worsening. There are no hypoglycemic associated symptoms. Pertinent negatives for hypoglycemia include no dizziness, headaches, nervousness/anxiousness, sleepiness, speech difficulty or sweats. There are no diabetic associated symptoms. Pertinent negatives for diabetes include no blurred vision, no chest pain, no fatigue, no foot paresthesias, no foot ulcerations, no polydipsia, no polyphagia, no polyuria, no visual change, no weakness and no weight loss. There are no hypoglycemic complications. Pertinent negatives for hypoglycemia complications include no blackouts, no hospitalization and no nocturnal hypoglycemia. Symptoms are stable. Pertinent negatives for diabetic complications include no CVA, heart disease, nephropathy or peripheral neuropathy. Risk factors for coronary artery disease include diabetes mellitus, male sex, hypertension, stress, family history, obesity and tobacco exposure. Current diabetic treatment includes oral agent (dual therapy). He is compliant with treatment some of the time. His weight is stable. He is following a generally unhealthy diet. When asked about meal planning, he reported none. He has had a previous visit with a dietitian. He rarely participates in exercise.  His home blood glucose trend is increasing steadily. His breakfast blood glucose range is generally 140-180 mg/dl. An ACE inhibitor/angiotensin II receptor blocker is not being taken. He does not see a podiatrist.Eye exam is not current. Hypertension  This is a chronic problem. The current episode started more than 1 year ago. The problem has been gradually worsening since onset. The problem is uncontrolled. Pertinent negatives include no blurred vision, chest pain, headaches, neck pain, orthopnea, palpitations, peripheral edema, shortness of breath or sweats. There are no associated agents to hypertension. Risk factors for coronary artery disease include diabetes mellitus, family history, obesity, male gender, smoking/tobacco exposure and stress. Past treatments include angiotensin blockers. The current treatment provides mild improvement. Compliance problems include exercise, diet and medication side effects. There is no history of kidney disease, CAD/MI, CVA or heart failure. There is no history of chronic renal disease. Past Medical History:     Past Medical History:   Diagnosis Date    Diabetes mellitus ([facility] [address]     Tobacco abuse       Reviewed all health maintenance requirements and ordered appropriate tests  Health Maintenance Due   Topic Date Due    Colorectal Cancer Screen  Never done    A1C test (Diabetic or Prediabetic)  12/14/2020    Depression Screen  09/14/2021       Past Surgical History:     Past Surgical History:   Procedure Laterality Date    APPENDECTOMY          Medications:       Prior to Admission medications    Medication Sig Start Date End Date Taking? Authorizing Provider   losartan (COZAAR) 100 MG tablet Take 1 tablet by mouth in the morning. 8/16/22  Yes NURY Beckham CNP   metFORMIN (GLUCOPHAGE) 1000 MG tablet Take 1 tablet by mouth in the morning and 1 tablet in the evening. Take with meals.  8/16/22  Yes NURY Beckham CNP   glimepiride (AMARYL) 1 MG tablet TAKE ONE AND ONE-HALF (1 & 1/2) TABLET BY MOUTH EVERY MORNING BEFORE BREAKFAST 7/18/22  Yes NURY Bernstein CNP   pantoprazole (PROTONIX) 40 MG tablet Take 1 tablet by mouth in the morning. 7/18/22  Yes NURY Bernstein CNP   Lancet Device MISC 1 Device by Does not apply route once for 1 dose One Touch Ultra glucometer  Patient not taking: Reported on 8/16/2022 3/11/20 9/14/20  NURY Bernstein CNP   blood glucose monitor strips Test 4 times a day & as needed for symptoms of irregular blood glucose. One Touch Ultra glucometer  Patient not taking: Reported on 8/16/2022 3/11/20   NURY Bernstein CNP        Allergies:       Lisinopril    Social History:     Tobacco:    reports that he has been smoking cigarettes. He started smoking about 23 years ago. He has a 40.00 pack-year smoking history. He has never used smokeless tobacco.  Alcohol:      reports no history of alcohol use. Drug Use:  reports no history of drug use. Family History:     Family History   Problem Relation Age of Onset    Diabetes Father     Diabetes Brother        Review of Systems:     Positive and Negative as described in HPI    Review of Systems   Constitutional:  Negative for chills, fatigue, fever and weight loss. HENT:  Negative for congestion, rhinorrhea and sore throat. Eyes:  Negative for blurred vision and visual disturbance. Respiratory:  Negative for cough, shortness of breath and wheezing. Cardiovascular:  Negative for chest pain, palpitations and orthopnea. Gastrointestinal:  Negative for abdominal pain, constipation, diarrhea, nausea and vomiting. Endocrine: Negative for polydipsia, polyphagia and polyuria. Genitourinary:  Negative for difficulty urinating and dysuria. Musculoskeletal:  Negative for gait problem, neck pain and neck stiffness. Skin:  Negative for rash. Neurological:  Negative for dizziness, syncope, speech difficulty, weakness, light-headedness and headaches.    Psychiatric/Behavioral:  The patient is not nervous/anxious. Physical Exam:   Vitals:  BP (!) 188/92 (Site: Right Upper Arm)   Pulse (!) 110   Temp 97.7 °F (36.5 °C) (Temporal)   Resp 22   Ht 5' 6\" (1.676 m)   Wt 237 lb (107.5 kg)   SpO2 98%   BMI 38.25 kg/m²     Physical Exam  Vitals and nursing note reviewed. Constitutional:       General: He is not in acute distress. Appearance: Normal appearance. He is obese. He is not ill-appearing. HENT:      Mouth/Throat:      Mouth: Mucous membranes are moist.      Pharynx: Oropharynx is clear. Eyes:      General: No scleral icterus. Conjunctiva/sclera: Conjunctivae normal.   Cardiovascular:      Rate and Rhythm: Normal rate and regular rhythm. Heart sounds: No murmur heard. Pulmonary:      Effort: Pulmonary effort is normal.      Breath sounds: Normal breath sounds. Abdominal:      General: Bowel sounds are normal. There is no distension. Palpations: Abdomen is soft. Tenderness: There is no abdominal tenderness. Musculoskeletal:      Cervical back: Normal range of motion and neck supple. Right lower leg: No edema. Left lower leg: No edema. Right foot: Normal range of motion. No deformity. Left foot: Normal range of motion. No deformity. Feet:      Right foot:      Protective Sensation: 5 sites tested. 5 sites sensed. Skin integrity: Warmth, callus and dry skin present. No ulcer, blister, skin breakdown, erythema or fissure. Toenail Condition: Right toenails are normal.      Left foot:      Protective Sensation: 5 sites tested. 5 sites sensed. Skin integrity: Warmth, callus and dry skin present. No ulcer, blister, skin breakdown, erythema or fissure. Toenail Condition: Left toenails are normal.   Lymphadenopathy:      Cervical: No cervical adenopathy. Skin:     General: Skin is warm and dry. Findings: No rash. Neurological:      Mental Status: He is alert and oriented to person, place, and time.    Psychiatric: Mood and Affect: Mood normal.         Behavior: Behavior normal.       Data:     Lab Results   Component Value Date/Time     06/13/2019 06:05 AM    K 4.2 06/13/2019 06:05 AM     06/13/2019 06:05 AM    CO2 22 06/13/2019 06:05 AM    BUN 10 06/13/2019 06:05 AM    CREATININE 0.78 06/13/2019 06:05 AM    GLUCOSE 262 06/13/2019 06:05 AM    PROT 8.7 06/11/2019 09:15 AM    LABALBU 4.2 06/11/2019 09:15 AM    BILITOT 0.78 06/11/2019 09:15 AM    ALKPHOS 284 06/11/2019 09:15 AM    AST 38 06/11/2019 09:15 AM    ALT 54 06/11/2019 09:15 AM     Lab Results   Component Value Date/Time    WBC 8.2 06/13/2019 06:05 AM    RBC 4.66 06/13/2019 06:05 AM    HGB 14.7 06/13/2019 06:05 AM    HCT 42.1 06/13/2019 06:05 AM    MCV 90.3 06/13/2019 06:05 AM    MCH 31.5 06/13/2019 06:05 AM    MCHC 34.9 06/13/2019 06:05 AM    RDW 12.4 06/13/2019 06:05 AM    PLT See Reflexed IPF Result 06/13/2019 06:05 AM    MPV NOT REPORTED 06/13/2019 06:05 AM     No results found for: TSH  Lab Results   Component Value Date/Time    CHOL 176 08/24/2019 08:12 AM    HDL 47 08/24/2019 08:12 AM    LABA1C 7.4 09/14/2020 08:21 AM       Assessment/Plan:      Diagnosis Orders   1. Controlled type 2 diabetes mellitus without complication, without long-term current use of insulin (HCC)  POCT glycosylated hemoglobin (Hb A1C)    metFORMIN (GLUCOPHAGE) 1000 MG tablet    ALT    AST    Basic Metabolic Panel    Lipid Panel    Microalbumin, Ur      2. Essential hypertension  losartan (COZAAR) 100 MG tablet      3. Colon cancer screening  POCT Fecal Immunochemical Test (FIT)      4. Screening PSA (prostate specific antigen)  PSA Screening        Restart medications as ordered. Recheck blood pressure in 2 weeks in the office. Labs are due now. We will follow with results. We will see him back in 6 months for routine visit, sooner if any issues. 1.  Jin received counseling on the following healthy behaviors: nutrition, exercise, and medication adherence  2. Patient given educational materials - see patient instructions  3. Was a self-tracking handout given in paper form or via [employer]? No  If yes, see orders or list here. 4.  Discussed use, benefit, and side effects of prescribed medications. Barriers to medication compliance addressed. All patient questions answered. Pt voiced understanding. 5.  Reviewed prior labs and health maintenance  6. Continue current medications, diet and exercise. Completed Refills   Requested Prescriptions     Signed Prescriptions Disp Refills    losartan (COZAAR) 100 MG tablet 90 tablet 3     Sig: Take 1 tablet by mouth in the morning. metFORMIN (GLUCOPHAGE) 1000 MG tablet 180 tablet 3     Sig: Take 1 tablet by mouth in the morning and 1 tablet in the evening. Take with meals. Return in about 6 months (around 2/16/2023).

## 2022-08-16 NOTE — PATIENT INSTRUCTIONS
SURVEY:     You may be receiving a survey from [employer] regarding your visit today. Please complete the survey to enable us to provide the highest quality of care to you and your family. If you cannot score us a very good on any question, please call the office to discuss how we could have made your experience a very good one.      Thank you,    Paco Corbin, APRN-CARLI Carey, APRN-CNP  Brooks Gandhi, LPN  Rock Allen, CMA  Lilly Burnett, CMA  Haritha, CMA  Lexii, PCA  Khalida, PM

## 2022-08-16 NOTE — TELEPHONE ENCOUNTER
Health Maintenance   Topic Date Due    Colorectal Cancer Screen  Never done    Depression Screen  09/14/2021    DTaP/Tdap/Td vaccine (1 - Tdap) 09/06/2022 (Originally 6/22/1979)    Shingles vaccine (1 of 2) 08/16/2023 (Originally 6/22/2010)    Pneumococcal 0-64 years Vaccine (1 - PCV) 08/16/2023 (Originally 6/22/1966)    COVID-19 Vaccine (3 - Booster for Pfizer series) 08/16/2023 (Originally 10/8/2021)    Hepatitis C screen  08/16/2023 (Originally 6/22/1978)    HIV screen  08/16/2023 (Originally 6/22/1975)    Flu vaccine (1) 09/01/2022    A1C test (Diabetic or Prediabetic)  11/16/2022    Lipids  08/24/2024    Hepatitis A vaccine  Aged Out    Hepatitis B vaccine  Aged Out    Hib vaccine  Aged Out    Meningococcal (ACWY) vaccine  Aged Out    Low dose CT lung screening  Discontinued             (applicable per patient's age: Cancer Screenings, Depression Screening, Fall Risk Screening, Immunizations)    Hemoglobin A1C (%)   Date Value   08/16/2022 8.6   09/14/2020 7.4   08/29/2019 5.9     Microalb/Crt.  Ratio (mcg/mg creat)   Date Value   08/24/2019 CANNOT BE CALCULATED     LDL Cholesterol (mg/dL)   Date Value   08/24/2019 93     AST (U/L)   Date Value   06/11/2019 38     ALT (U/L)   Date Value   06/11/2019 54 (H)     BUN (mg/dL)   Date Value   06/13/2019 10      (goal A1C is < 7)   (goal LDL is <100) need 30-50% reduction from baseline     BP Readings from Last 3 Encounters:   08/16/22 (!) 188/92   09/14/20 (!) 150/84   08/29/19 108/71    (goal /80)      All Future Testing planned in [facility]:  Lab Frequency Next Occurrence   CBC Auto Differential Once 12/31/2022   Hemoglobin A1C Once 12/31/2022   POCT Fecal Immunochemical Test (FIT) Once 09/06/2022   ALT Once 08/16/2022   AST Once 82/62/9639   Basic Metabolic Panel Once 01/93/7908   Lipid Panel Once 08/16/2022   Microalbumin, Ur Once 08/16/2022   PSA Screening Once 08/16/2022       Next Visit Date:  Future Appointments   Date Time Provider Elizabeth Davila 8/30/2022 11:00 AM HEAVEN[facility]   2/16/2023 11:20 AM NURY Astudillo CNP [facility]            Patient Active Problem List:     Controlled type 2 diabetes mellitus without complication, without long-term current use of insulin (HCC)     Tobacco abuse     Essential hypertension     Hypokalemia

## 2022-08-24 DIAGNOSIS — E11.9 DIABETES MELLITUS, NEW ONSET (HCC): ICD-10-CM

## 2022-08-24 RX ORDER — GLIMEPIRIDE 1 MG/1
TABLET ORAL
Qty: 45 TABLET | Refills: 3 | Status: SHIPPED | OUTPATIENT
Start: 2022-08-24

## 2022-08-24 NOTE — TELEPHONE ENCOUNTER
Next Visit Date:  Future Appointments   Date Time Provider Elizabeth Davila   8/30/2022 11:00 AM SCHEDULE, MHPX TIFF PRIMARY [facility]   2/16/2023 11:20 AM Filipe Corbin, APRN - CNP [facility]            Patient Active Problem List:     Controlled type 2 diabetes mellitus without complication, without long-term current use of insulin (HCC)     Tobacco abuse     Essential hypertension     Hypokalemia

## 2022-12-20 DIAGNOSIS — E11.9 DIABETES MELLITUS, NEW ONSET (HCC): ICD-10-CM

## 2022-12-20 RX ORDER — GLIMEPIRIDE 1 MG/1
TABLET ORAL
Qty: 135 TABLET | Refills: 1 | Status: SHIPPED | OUTPATIENT
Start: 2022-12-20

## 2023-02-13 DIAGNOSIS — E11.9 CONTROLLED TYPE 2 DIABETES MELLITUS WITHOUT COMPLICATION, WITHOUT LONG-TERM CURRENT USE OF INSULIN (HCC): ICD-10-CM

## 2023-02-13 RX ORDER — PANTOPRAZOLE SODIUM 40 MG/1
TABLET, DELAYED RELEASE ORAL
Qty: 90 TABLET | Refills: 1 | Status: SHIPPED | OUTPATIENT
Start: 2023-02-13

## 2023-06-16 DIAGNOSIS — E11.9 DIABETES MELLITUS, NEW ONSET (HCC): ICD-10-CM

## 2023-06-16 RX ORDER — GLIMEPIRIDE 1 MG/1
TABLET ORAL
Qty: 135 TABLET | Refills: 0 | Status: SHIPPED | OUTPATIENT
Start: 2023-06-16

## 2023-08-12 DIAGNOSIS — E11.9 CONTROLLED TYPE 2 DIABETES MELLITUS WITHOUT COMPLICATION, WITHOUT LONG-TERM CURRENT USE OF INSULIN (HCC): ICD-10-CM

## 2023-08-12 RX ORDER — PANTOPRAZOLE SODIUM 40 MG/1
TABLET, DELAYED RELEASE ORAL
Qty: 30 TABLET | Refills: 0 | Status: SHIPPED | OUTPATIENT
Start: 2023-08-12 | End: 2023-09-15 | Stop reason: SDUPTHER

## 2023-08-20 DIAGNOSIS — I10 ESSENTIAL HYPERTENSION: ICD-10-CM

## 2023-08-21 RX ORDER — LOSARTAN POTASSIUM 100 MG/1
TABLET ORAL
Qty: 90 TABLET | Refills: 3 | Status: SHIPPED | OUTPATIENT
Start: 2023-08-21

## 2023-09-14 DIAGNOSIS — E11.9 DIABETES MELLITUS, NEW ONSET (HCC): ICD-10-CM

## 2023-09-14 RX ORDER — GLIMEPIRIDE 1 MG/1
TABLET ORAL
Qty: 45 TABLET | Refills: 0 | Status: SHIPPED | OUTPATIENT
Start: 2023-09-14

## 2023-09-14 NOTE — TELEPHONE ENCOUNTER
Health Maintenance   Topic Date Due    Pneumococcal 0-64 years Vaccine (1 - PCV) Never done    HIV screen  Never done    Diabetic retinal exam  Never done    Hepatitis C screen  Never done    DTaP/Tdap/Td vaccine (1 - Tdap) Never done    Colorectal Cancer Screen  Never done    Shingles vaccine (1 of 2) Never done    GFR test (Diabetes, CKD 3-4, OR last GFR 15-59)  06/13/2020    Hepatitis B vaccine (1 of 3 - Risk 3-dose series) Never done    Diabetic Alb to Cr ratio (uACR) test  08/24/2020    Lipids  08/24/2020    COVID-19 Vaccine (3 - Pfizer series) 07/03/2021    Diabetic foot exam  09/14/2021    Flu vaccine (1) Never done    A1C test (Diabetic or Prediabetic)  08/16/2023    Depression Screen  08/16/2023    Hepatitis A vaccine  Aged Out    Hib vaccine  Aged Out    Meningococcal (ACWY) vaccine  Aged Out             (applicable per patient's age: Cancer Screenings, Depression Screening, Fall Risk Screening, Immunizations)    Hemoglobin A1C (%)   Date Value   08/16/2022 8.6   09/14/2020 7.4   08/29/2019 5.9     LDL Cholesterol (mg/dL)   Date Value   08/24/2019 93     AST (U/L)   Date Value   06/11/2019 38     ALT (U/L)   Date Value   06/11/2019 54 (H)     BUN (mg/dL)   Date Value   06/13/2019 10      (goal A1C is < 7)   (goal LDL is <100) need 30-50% reduction from baseline     BP Readings from Last 3 Encounters:   08/16/22 (!) 188/92   09/14/20 (!) 150/84   08/29/19 108/71    (goal /80)      All Future Testing planned in [facility]:  Lab Frequency Next Occurrence       Next Visit Date:  Future Appointments   Date Time Provider [facility] [address]   9/19/2023 10:00 AM Justina Crigler Might, APRN - CNP [facility]            Patient Active Problem List:     Controlled type 2 diabetes mellitus without complication, without long-term current use of insulin (HCC)     Tobacco abuse     Essential hypertension     Hypokalemia

## 2023-09-15 DIAGNOSIS — E11.9 CONTROLLED TYPE 2 DIABETES MELLITUS WITHOUT COMPLICATION, WITHOUT LONG-TERM CURRENT USE OF INSULIN (HCC): ICD-10-CM

## 2023-09-15 RX ORDER — PANTOPRAZOLE SODIUM 40 MG/1
40 TABLET, DELAYED RELEASE ORAL EVERY MORNING
Qty: 30 TABLET | Refills: 0 | Status: SHIPPED | OUTPATIENT
Start: 2023-09-15

## 2023-09-19 ENCOUNTER — OFFICE VISIT (OUTPATIENT)
Dept: PRIMARY CARE CLINIC | Age: 63
End: 2023-09-19

## 2023-09-19 VITALS
WEIGHT: 229 LBS | RESPIRATION RATE: 18 BRPM | HEART RATE: 94 BPM | OXYGEN SATURATION: 98 % | BODY MASS INDEX: 36.8 KG/M2 | SYSTOLIC BLOOD PRESSURE: 134 MMHG | HEIGHT: 66 IN | TEMPERATURE: 98.5 F | DIASTOLIC BLOOD PRESSURE: 80 MMHG

## 2023-09-19 DIAGNOSIS — Z12.5 SCREENING PSA (PROSTATE SPECIFIC ANTIGEN): ICD-10-CM

## 2023-09-19 DIAGNOSIS — R73.01 ELEVATED FASTING BLOOD SUGAR: ICD-10-CM

## 2023-09-19 DIAGNOSIS — Z20.7 SCABIES EXPOSURE: ICD-10-CM

## 2023-09-19 DIAGNOSIS — I10 ESSENTIAL HYPERTENSION: ICD-10-CM

## 2023-09-19 DIAGNOSIS — E11.9 CONTROLLED TYPE 2 DIABETES MELLITUS WITHOUT COMPLICATION, WITHOUT LONG-TERM CURRENT USE OF INSULIN (HCC): Primary | ICD-10-CM

## 2023-09-19 LAB — HBA1C MFR BLD: 6.1 %

## 2023-09-19 PROCEDURE — 3079F DIAST BP 80-89 MM HG: CPT | Performed by: NURSE PRACTITIONER

## 2023-09-19 PROCEDURE — 3075F SYST BP GE 130 - 139MM HG: CPT | Performed by: NURSE PRACTITIONER

## 2023-09-19 PROCEDURE — 99214 OFFICE O/P EST MOD 30 MIN: CPT | Performed by: NURSE PRACTITIONER

## 2023-09-19 PROCEDURE — 3044F HG A1C LEVEL LT 7.0%: CPT | Performed by: NURSE PRACTITIONER

## 2023-09-19 PROCEDURE — 83036 HEMOGLOBIN GLYCOSYLATED A1C: CPT | Performed by: NURSE PRACTITIONER

## 2023-09-19 RX ORDER — LOSARTAN POTASSIUM 100 MG/1
100 TABLET ORAL EVERY MORNING
Qty: 90 TABLET | Refills: 3 | Status: SHIPPED | OUTPATIENT
Start: 2023-09-19

## 2023-09-19 RX ORDER — PERMETHRIN 50 MG/G
CREAM TOPICAL
Qty: 60 G | Refills: 1 | Status: SHIPPED | OUTPATIENT
Start: 2023-09-19

## 2023-09-19 SDOH — ECONOMIC STABILITY: FOOD INSECURITY: WITHIN THE PAST 12 MONTHS, YOU WORRIED THAT YOUR FOOD WOULD RUN OUT BEFORE YOU GOT MONEY TO BUY MORE.: PATIENT DECLINED

## 2023-09-19 SDOH — ECONOMIC STABILITY: HOUSING INSECURITY
IN THE LAST 12 MONTHS, WAS THERE A TIME WHEN YOU DID NOT HAVE A STEADY PLACE TO SLEEP OR SLEPT IN A SHELTER (INCLUDING NOW)?: PATIENT REFUSED

## 2023-09-19 SDOH — ECONOMIC STABILITY: INCOME INSECURITY: HOW HARD IS IT FOR YOU TO PAY FOR THE VERY BASICS LIKE FOOD, HOUSING, MEDICAL CARE, AND HEATING?: PATIENT DECLINED

## 2023-09-19 SDOH — ECONOMIC STABILITY: FOOD INSECURITY: WITHIN THE PAST 12 MONTHS, THE FOOD YOU BOUGHT JUST DIDN'T LAST AND YOU DIDN'T HAVE MONEY TO GET MORE.: PATIENT DECLINED

## 2023-09-19 ASSESSMENT — PATIENT HEALTH QUESTIONNAIRE - PHQ9
SUM OF ALL RESPONSES TO PHQ QUESTIONS 1-9: 0
SUM OF ALL RESPONSES TO PHQ QUESTIONS 1-9: 0
1. LITTLE INTEREST OR PLEASURE IN DOING THINGS: 0
SUM OF ALL RESPONSES TO PHQ QUESTIONS 1-9: 0
SUM OF ALL RESPONSES TO PHQ QUESTIONS 1-9: 0
2. FEELING DOWN, DEPRESSED OR HOPELESS: 0
SUM OF ALL RESPONSES TO PHQ9 QUESTIONS 1 & 2: 0

## 2023-09-19 ASSESSMENT — ENCOUNTER SYMPTOMS
COUGH: 0
DIARRHEA: 0
CONSTIPATION: 0
ABDOMINAL PAIN: 0
WHEEZING: 0
VOMITING: 0
NAUSEA: 0
VISUAL CHANGE: 0
SHORTNESS OF BREATH: 0
BLURRED VISION: 0
SORE THROAT: 0
ORTHOPNEA: 0
RHINORRHEA: 0

## 2023-09-19 NOTE — PROGRESS NOTES
Name: Randy Martinez  : 1960         Chief Complaint:     Chief Complaint   Patient presents with    Diabetes     Routine check. No concerns. Rash     Arms, groin area       History of Present Illness:      Randy Martinez is a 61 y.o.  male who presents with Diabetes (Routine check. No concerns.) and Rash (Arms, groin area)      Will Vergara is here today for a routine office visit. DM-improved, patient is compliant with his medication and diet. He states he is not exercising but he is trying to do some walking. See below for further comments. Scabies exposure-patient states he was around a child who who was diagnosed with scabies. He does have a rash to his upper arms and upper legs/groin. He states he does not believe it is scabies he thinks it is stress-induced due to having a lot of anxiety recently. In any case I do recommend treatment and he is agreeable. Diabetes  He presents for his follow-up diabetic visit. He has type 2 diabetes mellitus. Onset time: YEARS. His disease course has been improving. There are no hypoglycemic associated symptoms. Pertinent negatives for hypoglycemia include no dizziness, headaches, nervousness/anxiousness, sleepiness, speech difficulty or sweats. There are no diabetic associated symptoms. Pertinent negatives for diabetes include no blurred vision, no chest pain, no fatigue, no foot paresthesias, no foot ulcerations, no polydipsia, no polyphagia, no polyuria, no visual change, no weakness and no weight loss. There are no hypoglycemic complications. Pertinent negatives for hypoglycemia complications include no blackouts, no hospitalization and no nocturnal hypoglycemia. Symptoms are stable. Pertinent negatives for diabetic complications include no CVA, heart disease, nephropathy or peripheral neuropathy. Risk factors for coronary artery disease include diabetes mellitus, male sex, hypertension, stress, family history, obesity and tobacco exposure.  Current

## 2023-09-19 NOTE — PATIENT INSTRUCTIONS
SURVEY:     You may be receiving a survey from [employer] regarding your visit today. Please complete the survey to enable us to provide the highest quality of care to you and your family. If you cannot score us a very good on any question, please call the office to discuss how we could have made your experience a very good one.      Thank you,    Jayde Corbin, APRN-CNP  Ceola Barthel, APRN-CNP  Jared Ray, NAINAN  Liat Conway, CMA  Maxine Johnston, CMA  Haritha, CMA  Lexii, PCA  Khalida, PM

## 2023-10-12 DIAGNOSIS — E11.9 CONTROLLED TYPE 2 DIABETES MELLITUS WITHOUT COMPLICATION, WITHOUT LONG-TERM CURRENT USE OF INSULIN (HCC): ICD-10-CM

## 2023-10-12 DIAGNOSIS — E11.9 DIABETES MELLITUS, NEW ONSET (HCC): ICD-10-CM

## 2023-10-12 RX ORDER — GLIMEPIRIDE 1 MG/1
TABLET ORAL
Qty: 45 TABLET | Refills: 0 | Status: SHIPPED | OUTPATIENT
Start: 2023-10-12

## 2023-10-12 RX ORDER — PANTOPRAZOLE SODIUM 40 MG/1
40 TABLET, DELAYED RELEASE ORAL EVERY MORNING
Qty: 30 TABLET | Refills: 0 | Status: SHIPPED | OUTPATIENT
Start: 2023-10-12

## 2023-10-19 ENCOUNTER — TELEPHONE (OUTPATIENT)
Dept: PRIMARY CARE CLINIC | Age: 63
End: 2023-10-19

## 2023-10-19 DIAGNOSIS — L20.9 ATOPIC DERMATITIS, UNSPECIFIED TYPE: Primary | ICD-10-CM

## 2023-10-19 RX ORDER — HYDROXYZINE HYDROCHLORIDE 25 MG/1
25 TABLET, FILM COATED ORAL EVERY 8 HOURS PRN
Qty: 30 TABLET | Refills: 0 | Status: SHIPPED | OUTPATIENT
Start: 2023-10-19 | End: 2023-10-29

## 2023-10-19 RX ORDER — METHYLPREDNISOLONE 4 MG/1
TABLET ORAL
Qty: 1 KIT | Refills: 0 | Status: SHIPPED | OUTPATIENT
Start: 2023-10-19 | End: 2023-10-25

## 2023-10-19 NOTE — TELEPHONE ENCOUNTER
Patients wife was notified, they would not like a referral to derm at this time, patient will be contacting the office for an appointment if rash worsens.

## 2023-10-19 NOTE — TELEPHONE ENCOUNTER
Patients wife contacted the office in regards to the ongoing rash that [facility] has. Patients wife would like to know if [facility] can be prescribed MethylPREDNISolone (Medrol dose pack) to help with the itching. Please advise.

## 2023-10-19 NOTE — TELEPHONE ENCOUNTER
Can try the medrol dose pack and hydroxyzine for itching. Needs to be seen if worsening. We can also refer him to dermatology if they would like.

## 2023-10-19 NOTE — TELEPHONE ENCOUNTER
Patients wife states that Murphy Gomes has tried numerous creams and lotions other than the scabies medication, many OTC creams. She is also stating that his rash may be getting worse.      Please advise

## 2023-11-17 ENCOUNTER — HOSPITAL ENCOUNTER (OUTPATIENT)
Age: 63
Discharge: HOME | End: 2023-11-17

## 2023-11-17 DIAGNOSIS — Z12.5 SCREENING PSA (PROSTATE SPECIFIC ANTIGEN): ICD-10-CM

## 2023-11-17 DIAGNOSIS — E11.9 CONTROLLED TYPE 2 DIABETES MELLITUS WITHOUT COMPLICATION, WITHOUT LONG-TERM CURRENT USE OF INSULIN (HCC): ICD-10-CM

## 2023-11-17 LAB
ALT SERPL-CCNC: 18 U/L (ref 5–41)
ANION GAP SERPL CALCULATED.3IONS-SCNC: 9 MMOL/L (ref 9–17)
AST SERPL-CCNC: 23 U/L
BASOPHILS # BLD: 0.06 K/UL (ref 0–0.2)
BASOPHILS NFR BLD: 1 % (ref 0–2)
BUN SERPL-MCNC: 13 MG/DL (ref 8–23)
BUN/CREAT SERPL: 14 (ref 9–20)
CALCIUM SERPL-MCNC: 9.1 MG/DL (ref 8.6–10.4)
CHLORIDE SERPL-SCNC: 104 MMOL/L (ref 98–107)
CHOLEST SERPL-MCNC: 197 MG/DL
CHOLESTEROL/HDL RATIO: 4.4
CO2 SERPL-SCNC: 25 MMOL/L (ref 20–31)
CREAT SERPL-MCNC: 0.9 MG/DL (ref 0.7–1.2)
EOSINOPHIL # BLD: 0.48 K/UL (ref 0–0.44)
EOSINOPHILS RELATIVE PERCENT: 4 % (ref 1–4)
ERYTHROCYTE [DISTWIDTH] IN BLOOD BY AUTOMATED COUNT: 14.6 % (ref 11.8–14.4)
EST. AVERAGE GLUCOSE BLD GHB EST-MCNC: 120 MG/DL
GFR SERPL CREATININE-BSD FRML MDRD: >60 ML/MIN/1.73M2
GLUCOSE SERPL-MCNC: 106 MG/DL (ref 70–99)
HBA1C MFR BLD: 5.8 % (ref 4–6)
HCT VFR BLD AUTO: 44.6 % (ref 40.7–50.3)
HDLC SERPL-MCNC: 45 MG/DL
HGB BLD-MCNC: 14.8 G/DL (ref 13–17)
IMM GRANULOCYTES # BLD AUTO: 0.07 K/UL (ref 0–0.3)
IMM GRANULOCYTES NFR BLD: 1 %
LDLC SERPL CALC-MCNC: 129 MG/DL (ref 0–130)
LYMPHOCYTES NFR BLD: 4.43 K/UL (ref 1.1–3.7)
LYMPHOCYTES RELATIVE PERCENT: 36 % (ref 24–43)
MCH RBC QN AUTO: 27.7 PG (ref 25.2–33.5)
MCHC RBC AUTO-ENTMCNC: 33.2 G/DL (ref 28.4–34.8)
MCV RBC AUTO: 83.5 FL (ref 82.6–102.9)
MONOCYTES NFR BLD: 0.83 K/UL (ref 0.1–1.2)
MONOCYTES NFR BLD: 7 % (ref 3–12)
NEUTROPHILS NFR BLD: 51 % (ref 36–65)
NEUTS SEG NFR BLD: 6.55 K/UL (ref 1.5–8.1)
NRBC BLD-RTO: 0 PER 100 WBC
PLATELET # BLD AUTO: 253 K/UL (ref 138–453)
PMV BLD AUTO: 10.3 FL (ref 8.1–13.5)
POTASSIUM SERPL-SCNC: 4.1 MMOL/L (ref 3.7–5.3)
PSA SERPL-MCNC: 0.3 NG/ML (ref 0–4)
RBC # BLD AUTO: 5.34 M/UL (ref 4.21–5.77)
SODIUM SERPL-SCNC: 138 MMOL/L (ref 135–144)
TRIGL SERPL-MCNC: 117 MG/DL
WBC OTHER # BLD: 12.4 K/UL (ref 3.5–11.3)

## 2023-11-17 PROCEDURE — 82570 ASSAY OF URINE CREATININE: CPT

## 2023-11-17 PROCEDURE — 84460 ALANINE AMINO (ALT) (SGPT): CPT

## 2023-11-17 PROCEDURE — 84450 TRANSFERASE (AST) (SGOT): CPT

## 2023-11-17 PROCEDURE — 36415 COLL VENOUS BLD VENIPUNCTURE: CPT

## 2023-11-17 PROCEDURE — G0103 PSA SCREENING: HCPCS

## 2023-11-17 PROCEDURE — 83036 HEMOGLOBIN GLYCOSYLATED A1C: CPT

## 2023-11-17 PROCEDURE — 85025 COMPLETE CBC W/AUTO DIFF WBC: CPT

## 2023-11-17 PROCEDURE — 82043 UR ALBUMIN QUANTITATIVE: CPT

## 2023-11-17 PROCEDURE — 80061 LIPID PANEL: CPT

## 2023-11-17 PROCEDURE — 80048 BASIC METABOLIC PNL TOTAL CA: CPT

## 2023-11-18 LAB
CREAT UR-MCNC: 161 MG/DL (ref 39–259)
MICROALBUMIN UR-MCNC: <12 MG/L
MICROALBUMIN/CREAT UR-RTO: NORMAL MCG/MG CREAT

## 2023-11-20 ENCOUNTER — TELEPHONE (OUTPATIENT)
Dept: PRIMARY CARE CLINIC | Age: 63
End: 2023-11-20

## 2023-11-20 DIAGNOSIS — E11.9 CONTROLLED TYPE 2 DIABETES MELLITUS WITHOUT COMPLICATION, WITHOUT LONG-TERM CURRENT USE OF INSULIN (HCC): ICD-10-CM

## 2023-11-20 DIAGNOSIS — E11.9 DIABETES MELLITUS, NEW ONSET (HCC): ICD-10-CM

## 2023-11-20 RX ORDER — PANTOPRAZOLE SODIUM 40 MG/1
40 TABLET, DELAYED RELEASE ORAL EVERY MORNING
Qty: 90 TABLET | Refills: 1 | Status: SHIPPED | OUTPATIENT
Start: 2023-11-20

## 2023-11-20 RX ORDER — GLIMEPIRIDE 1 MG/1
TABLET ORAL
Qty: 135 TABLET | Refills: 1 | Status: SHIPPED | OUTPATIENT
Start: 2023-11-20

## 2023-11-20 NOTE — TELEPHONE ENCOUNTER
----- Message from [address] [facility] NURY Reilly - CNP sent at 11/19/2023  4:27 PM EST -----  Labs are stable, continue all current medications.

## 2023-11-20 NOTE — TELEPHONE ENCOUNTER
Health Maintenance   Topic Date Due    Colorectal Cancer Screen  Never done    Diabetic foot exam  09/19/2024 (Originally 9/14/2021)    Hepatitis B vaccine (1 of 3 - Risk 3-dose series) 09/19/2024 (Originally 6/22/2020)    DTaP/Tdap/Td vaccine (1 - Tdap) 09/19/2024 (Originally 6/22/1979)    Flu vaccine (1) 09/19/2024 (Originally 8/1/2023)    Shingles vaccine (1 of 2) 09/19/2024 (Originally 6/22/2010)    Pneumococcal 0-64 years Vaccine (1 - PCV) 09/19/2024 (Originally 6/22/1966)    COVID-19 Vaccine (3 - 2023-24 season) 09/19/2024 (Originally 9/1/2023)    Hepatitis C screen  09/19/2024 (Originally 6/22/1978)    HIV screen  09/19/2024 (Originally 6/22/1975)    Diabetic retinal exam  09/28/2024 (Originally 6/22/1978)    Depression Screen  09/19/2024    A1C test (Diabetic or Prediabetic)  11/17/2024    Diabetic Alb to Cr ratio (uACR) test  11/17/2024    Lipids  11/17/2024    GFR test (Diabetes, CKD 3-4, OR last GFR 15-59)  11/17/2024    Hepatitis A vaccine  Aged Out    Hib vaccine  Aged Out    Meningococcal (ACWY) vaccine  Aged Out    Low dose CT lung screening &/or counseling  Discontinued    Prostate Specific Antigen (PSA) Screening or Monitoring  Discontinued             (applicable per patient's age: Cancer Screenings, Depression Screening, Fall Risk Screening, Immunizations)    Hemoglobin A1C (%)   Date Value   11/17/2023 5.8   09/19/2023 6.1   08/16/2022 8.6     LDL Cholesterol (mg/dL)   Date Value   11/17/2023 129     AST (U/L)   Date Value   11/17/2023 23     ALT (U/L)   Date Value   11/17/2023 18     BUN (mg/dL)   Date Value   11/17/2023 13      (goal A1C is < 7)   (goal LDL is <100) need 30-50% reduction from baseline     BP Readings from Last 3 Encounters:   09/19/23 134/80   08/16/22 (!) 188/92   09/14/20 (!) 150/84    (goal /80)      All Future Testing planned in [facility]:  Lab Frequency Next Occurrence       Next Visit Date:  Future Appointments   Date Time Provider [address]   3/19/2024 10:00

## 2024-05-15 DIAGNOSIS — E11.9 DIABETES MELLITUS, NEW ONSET (HCC): ICD-10-CM

## 2024-05-15 RX ORDER — GLIMEPIRIDE 1 MG/1
TABLET ORAL
Qty: 135 TABLET | Refills: 1 | Status: SHIPPED | OUTPATIENT
Start: 2024-05-15

## 2024-05-15 NOTE — TELEPHONE ENCOUNTER
Health Maintenance   Topic Date Due    Colorectal Cancer Screen  Never done    Respiratory Syncytial Virus (RSV) Pregnant or age 60 yrs+ (1 - 1-dose 60+ series) Never done    Diabetic foot exam  09/19/2024 (Originally 9/14/2021)    DTaP/Tdap/Td vaccine (1 - Tdap) 09/19/2024 (Originally 6/22/1979)    Flu vaccine (Season Ended) 09/19/2024 (Originally 8/1/2024)    Shingles vaccine (1 of 2) 09/19/2024 (Originally 6/22/2010)    Pneumococcal 0-64 years Vaccine (1 of 2 - PCV) 09/19/2024 (Originally 6/22/1966)    COVID-19 Vaccine (3 - 2023-24 season) 09/19/2024 (Originally 9/1/2023)    Hepatitis C screen  09/19/2024 (Originally 6/22/1978)    HIV screen  09/19/2024 (Originally 6/22/1975)    Diabetic retinal exam  09/28/2024 (Originally 6/22/1978)    Depression Screen  09/19/2024    A1C test (Diabetic or Prediabetic)  11/17/2024    Diabetic Alb to Cr ratio (uACR) test  11/17/2024    Lipids  11/17/2024    GFR test (Diabetes, CKD 3-4, OR last GFR 15-59)  11/17/2024    Hepatitis A vaccine  Aged Out    Hepatitis B vaccine  Aged Out    Hib vaccine  Aged Out    Polio vaccine  Aged Out    Meningococcal (ACWY) vaccine  Aged Out    Low dose CT lung screening &/or counseling  Discontinued    Prostate Specific Antigen (PSA) Screening or Monitoring  Discontinued             (applicable per patient's age: Cancer Screenings, Depression Screening, Fall Risk Screening, Immunizations)    Hemoglobin A1C (%)   Date Value   11/17/2023 5.8   09/19/2023 6.1   08/16/2022 8.6     AST (U/L)   Date Value   11/17/2023 23     ALT (U/L)   Date Value   11/17/2023 18     BUN (mg/dL)   Date Value   11/17/2023 13      (goal A1C is < 7)   (goal LDL is <100) need 30-50% reduction from baseline     BP Readings from Last 3 Encounters:   09/19/23 134/80   08/16/22 (!) 188/92   09/14/20 (!) 150/84    (goal /80)      All Future Testing planned in [facility]:  Lab Frequency Next Occurrence       Next Visit Date:  No future appointments.         Patient Active

## 2024-05-28 DIAGNOSIS — E11.9 CONTROLLED TYPE 2 DIABETES MELLITUS WITHOUT COMPLICATION, WITHOUT LONG-TERM CURRENT USE OF INSULIN (HCC): ICD-10-CM

## 2024-05-28 RX ORDER — PANTOPRAZOLE SODIUM 40 MG/1
40 TABLET, DELAYED RELEASE ORAL EVERY MORNING
Qty: 90 TABLET | Refills: 1 | Status: SHIPPED | OUTPATIENT
Start: 2024-05-28

## 2024-05-28 NOTE — TELEPHONE ENCOUNTER
Problem List:     Controlled type 2 diabetes mellitus without complication, without long-term current use of insulin (HCC)     Tobacco abuse     Essential hypertension     Hypokalemia

## 2024-06-21 ENCOUNTER — TELEPHONE (OUTPATIENT)
Dept: PRIMARY CARE CLINIC | Age: 64
End: 2024-06-21

## 2024-09-16 DIAGNOSIS — I10 ESSENTIAL HYPERTENSION: ICD-10-CM

## 2024-09-16 RX ORDER — LOSARTAN POTASSIUM 100 MG/1
100 TABLET ORAL EVERY MORNING
Qty: 90 TABLET | Refills: 3 | Status: SHIPPED | OUTPATIENT
Start: 2024-09-16

## 2024-10-25 DIAGNOSIS — E11.9 CONTROLLED TYPE 2 DIABETES MELLITUS WITHOUT COMPLICATION, WITHOUT LONG-TERM CURRENT USE OF INSULIN (HCC): ICD-10-CM

## 2024-11-07 DIAGNOSIS — E11.9 DIABETES MELLITUS, NEW ONSET (HCC): ICD-10-CM

## 2024-11-07 RX ORDER — GLIMEPIRIDE 1 MG/1
TABLET ORAL
Qty: 45 TABLET | Refills: 0 | Status: SHIPPED | OUTPATIENT
Start: 2024-11-07

## 2024-11-20 DIAGNOSIS — E11.9 CONTROLLED TYPE 2 DIABETES MELLITUS WITHOUT COMPLICATION, WITHOUT LONG-TERM CURRENT USE OF INSULIN (HCC): ICD-10-CM

## 2024-11-20 RX ORDER — PANTOPRAZOLE SODIUM 40 MG/1
40 TABLET, DELAYED RELEASE ORAL EVERY MORNING
Qty: 30 TABLET | Refills: 0 | Status: SHIPPED | OUTPATIENT
Start: 2024-11-20

## 2024-12-09 DIAGNOSIS — E11.9 DIABETES MELLITUS, NEW ONSET (HCC): ICD-10-CM

## 2024-12-09 RX ORDER — GLIMEPIRIDE 1 MG/1
TABLET ORAL
Qty: 90 TABLET | Refills: 0 | OUTPATIENT
Start: 2024-12-09

## 2024-12-09 NOTE — TELEPHONE ENCOUNTER
Health Maintenance   Topic Date Due    Pneumococcal 0-64 years Vaccine (1 of 2 - PCV) Never done    HIV screen  Never done    Diabetic retinal exam  Never done    Hepatitis C screen  Never done    DTaP/Tdap/Td vaccine (1 - Tdap) Never done    Colorectal Cancer Screen  Never done    Shingles vaccine (1 of 2) Never done    Lung Cancer Screening &/or Counseling  Never done    Diabetic foot exam  09/14/2021    Flu vaccine (1) Never done    COVID-19 Vaccine (3 - 2023-24 season) 09/01/2024    Depression Screen  09/19/2024    A1C test (Diabetic or Prediabetic)  11/17/2024    Diabetic Alb to Cr ratio (uACR) test  11/17/2024    Lipids  11/17/2024    GFR test (Diabetes, CKD 3-4, OR last GFR 15-59)  11/17/2024    Respiratory Syncytial Virus (RSV) Pregnant or age 60 yrs+ (1 - 1-dose 75+ series) 06/22/2035    Hepatitis A vaccine  Aged Out    Hepatitis B vaccine  Aged Out    Hib vaccine  Aged Out    Polio vaccine  Aged Out    Meningococcal (ACWY) vaccine  Aged Out    Prostate Specific Antigen (PSA) Screening or Monitoring  Discontinued             (applicable per patient's age: Cancer Screenings, Depression Screening, Fall Risk Screening, Immunizations)    Hemoglobin A1C (%)   Date Value   11/17/2023 5.8   09/19/2023 6.1   08/16/2022 8.6     AST (U/L)   Date Value   11/17/2023 23     ALT (U/L)   Date Value   11/17/2023 18     BUN (mg/dL)   Date Value   11/17/2023 13      (goal A1C is < 7)   (goal LDL is <100) need 30-50% reduction from baseline     BP Readings from Last 3 Encounters:   09/19/23 134/80   08/16/22 (!) 188/92   09/14/20 (!) 150/84    (goal /80)      All Future Testing planned in [facility]:  Lab Frequency Next Occurrence       Next Visit Date:  Future Appointments   Date Time Provider Department Center   1/7/2025 11:00 AM Omar Corbin, APRN - CNP [facility]            Patient Active Problem List:     Controlled type 2 diabetes mellitus without complication, without long-term current use of

## 2024-12-10 DIAGNOSIS — E11.9 DIABETES MELLITUS, NEW ONSET (HCC): ICD-10-CM

## 2024-12-10 RX ORDER — GLIMEPIRIDE 1 MG/1
TABLET ORAL
Qty: 45 TABLET | Refills: 0 | Status: SHIPPED | OUTPATIENT
Start: 2024-12-10

## 2024-12-10 NOTE — TELEPHONE ENCOUNTER
[facility]:  Lab Frequency Next Occurrence       Next Visit Date:  Future Appointments   Date Time Provider Department Center   12/18/2024  3:00 PM Omar Corbin, APRN - CNP [facility]            Patient Active Problem List:     Controlled type 2 diabetes mellitus without complication, without long-term current use of insulin (HCC)     Tobacco abuse     Essential hypertension     Hypokalemia

## 2024-12-13 DIAGNOSIS — E11.9 CONTROLLED TYPE 2 DIABETES MELLITUS WITHOUT COMPLICATION, WITHOUT LONG-TERM CURRENT USE OF INSULIN (HCC): ICD-10-CM

## 2024-12-13 NOTE — TELEPHONE ENCOUNTER
Health Maintenance   Topic Date Due    Pneumococcal 0-64 years Vaccine (1 of 2 - PCV) Never done    HIV screen  Never done    Diabetic retinal exam  Never done    Hepatitis C screen  Never done    DTaP/Tdap/Td vaccine (1 - Tdap) Never done    Colorectal Cancer Screen  Never done    Shingles vaccine (1 of 2) Never done    Lung Cancer Screening &/or Counseling  Never done    Diabetic foot exam  09/14/2021    Flu vaccine (1) Never done    COVID-19 Vaccine (3 - 2023-24 season) 09/01/2024    Depression Screen  09/19/2024    A1C test (Diabetic or Prediabetic)  11/17/2024    Diabetic Alb to Cr ratio (uACR) test  11/17/2024    Lipids  11/17/2024    GFR test (Diabetes, CKD 3-4, OR last GFR 15-59)  11/17/2024    Respiratory Syncytial Virus (RSV) Pregnant or age 60 yrs+ (1 - 1-dose 75+ series) 06/22/2035    Hepatitis A vaccine  Aged Out    Hepatitis B vaccine  Aged Out    Hib vaccine  Aged Out    Polio vaccine  Aged Out    Meningococcal (ACWY) vaccine  Aged Out    Prostate Specific Antigen (PSA) Screening or Monitoring  Discontinued             (applicable per patient's age: Cancer Screenings, Depression Screening, Fall Risk Screening, Immunizations)    Hemoglobin A1C (%)   Date Value   11/17/2023 5.8   09/19/2023 6.1   08/16/2022 8.6     AST (U/L)   Date Value   11/17/2023 23     ALT (U/L)   Date Value   11/17/2023 18     BUN (mg/dL)   Date Value   11/17/2023 13      (goal A1C is < 7)   (goal LDL is <100) need 30-50% reduction from baseline     BP Readings from Last 3 Encounters:   09/19/23 134/80   08/16/22 (!) 188/92   09/14/20 (!) 150/84    (goal /80)      All Future Testing planned in [facility]:  Lab Frequency Next Occurrence       Next Visit Date:  Future Appointments   Date Time Provider Department Center   12/18/2024  3:00 PM Omar Corbin, APRN - CNP [facility]            Patient Active Problem List:     Controlled type 2 diabetes mellitus without complication, without long-term current use of

## 2024-12-18 ENCOUNTER — OFFICE VISIT (OUTPATIENT)
Dept: PRIMARY CARE CLINIC | Age: 64
End: 2024-12-18

## 2024-12-18 VITALS
WEIGHT: 241 LBS | DIASTOLIC BLOOD PRESSURE: 86 MMHG | HEIGHT: 66 IN | SYSTOLIC BLOOD PRESSURE: 142 MMHG | BODY MASS INDEX: 38.73 KG/M2 | HEART RATE: 94 BPM | OXYGEN SATURATION: 97 %

## 2024-12-18 DIAGNOSIS — E11.9 CONTROLLED TYPE 2 DIABETES MELLITUS WITHOUT COMPLICATION, WITHOUT LONG-TERM CURRENT USE OF INSULIN (HCC): Primary | ICD-10-CM

## 2024-12-18 DIAGNOSIS — I10 ESSENTIAL HYPERTENSION: ICD-10-CM

## 2024-12-18 DIAGNOSIS — Z12.5 SCREENING PSA (PROSTATE SPECIFIC ANTIGEN): ICD-10-CM

## 2024-12-18 LAB — HBA1C MFR BLD: 6.8 %

## 2024-12-18 PROCEDURE — 3077F SYST BP >= 140 MM HG: CPT | Performed by: NURSE PRACTITIONER

## 2024-12-18 PROCEDURE — 99214 OFFICE O/P EST MOD 30 MIN: CPT | Performed by: NURSE PRACTITIONER

## 2024-12-18 PROCEDURE — 3044F HG A1C LEVEL LT 7.0%: CPT | Performed by: NURSE PRACTITIONER

## 2024-12-18 PROCEDURE — 3079F DIAST BP 80-89 MM HG: CPT | Performed by: NURSE PRACTITIONER

## 2024-12-18 PROCEDURE — 83036 HEMOGLOBIN GLYCOSYLATED A1C: CPT | Performed by: NURSE PRACTITIONER

## 2024-12-18 RX ORDER — GLIMEPIRIDE 1 MG/1
TABLET ORAL
Qty: 135 TABLET | Refills: 3 | Status: SHIPPED | OUTPATIENT
Start: 2024-12-18

## 2024-12-18 RX ORDER — PANTOPRAZOLE SODIUM 40 MG/1
40 TABLET, DELAYED RELEASE ORAL EVERY MORNING
Qty: 90 TABLET | Refills: 3 | Status: SHIPPED | OUTPATIENT
Start: 2024-12-18

## 2024-12-18 NOTE — PROGRESS NOTES
sex, hypertension, stress, family history, obesity and tobacco exposure. Current diabetic treatment includes oral agent (dual therapy). He is compliant with treatment all of the time. His weight is stable. He is following a generally healthy diet. Meal planning includes avoidance of concentrated sweets. He has had a previous visit with a dietitian. He participates in exercise intermittently. His home blood glucose trend is decreasing steadily. His breakfast blood glucose range is generally 110-130 mg/dl. An ACE inhibitor/angiotensin II receptor blocker is being taken. He does not see a podiatrist.Eye exam is not current.   Hypertension  This is a chronic problem. The current episode started more than 1 year ago. The problem has been gradually improving since onset. The problem is controlled. Pertinent negatives include no blurred vision, chest pain, headaches, neck pain, orthopnea, palpitations, peripheral edema, shortness of breath or sweats. There are no associated agents to hypertension. Risk factors for coronary artery disease include diabetes mellitus, family history, obesity, male gender, smoking/tobacco exposure and stress. Past treatments include angiotensin blockers. The current treatment provides moderate improvement. Compliance problems include exercise.  There is no history of kidney disease, CAD/MI, CVA or heart failure. There is no history of chronic renal disease.         Past Medical History:     Past Medical History:   Diagnosis Date    Diabetes mellitus (HCC)     Tobacco abuse       Reviewed all health maintenance requirements and ordered appropriate tests  Health Maintenance Due   Topic Date Due    Pneumococcal 0-64 years Vaccine (1 of 2 - PCV) Never done    HIV screen  Never done    Diabetic retinal exam  Never done    Hepatitis C screen  Never done    DTaP/Tdap/Td vaccine (1 - Tdap) Never done    Colorectal Cancer Screen  Never done    Shingles vaccine (1 of 2) Never done    Lung Cancer

## 2024-12-19 ASSESSMENT — ENCOUNTER SYMPTOMS
BLURRED VISION: 0
VOMITING: 0
WHEEZING: 0
SHORTNESS OF BREATH: 0
SORE THROAT: 0
DIARRHEA: 0
CONSTIPATION: 0
ORTHOPNEA: 0
RHINORRHEA: 0
VISUAL CHANGE: 0
NAUSEA: 0
ABDOMINAL PAIN: 0
COUGH: 0

## 2025-03-13 ENCOUNTER — TELEPHONE (OUTPATIENT)
Dept: PRIMARY CARE CLINIC | Age: 65
End: 2025-03-13

## 2025-03-13 DIAGNOSIS — E11.9 CONTROLLED TYPE 2 DIABETES MELLITUS WITHOUT COMPLICATION, WITHOUT LONG-TERM CURRENT USE OF INSULIN (HCC): ICD-10-CM

## 2025-03-14 ENCOUNTER — HOSPITAL ENCOUNTER (OUTPATIENT)
Age: 65
Discharge: HOME | End: 2025-03-14

## 2025-03-14 DIAGNOSIS — Z12.5 SCREENING PSA (PROSTATE SPECIFIC ANTIGEN): ICD-10-CM

## 2025-03-14 DIAGNOSIS — E11.9 CONTROLLED TYPE 2 DIABETES MELLITUS WITHOUT COMPLICATION, WITHOUT LONG-TERM CURRENT USE OF INSULIN (HCC): ICD-10-CM

## 2025-03-14 LAB
ALT SERPL-CCNC: 18 U/L (ref 10–50)
ANION GAP SERPL CALCULATED.3IONS-SCNC: 10 MMOL/L (ref 9–16)
AST SERPL-CCNC: 22 U/L (ref 10–50)
BASOPHILS # BLD: 0.03 K/UL (ref 0–0.2)
BASOPHILS NFR BLD: 0 % (ref 0–2)
BUN SERPL-MCNC: 14 MG/DL (ref 8–23)
BUN/CREAT SERPL: 14 (ref 9–20)
CALCIUM SERPL-MCNC: 8.5 MG/DL (ref 8.6–10.4)
CHLORIDE SERPL-SCNC: 108 MMOL/L (ref 98–107)
CHOLEST SERPL-MCNC: 171 MG/DL (ref 0–199)
CHOLESTEROL/HDL RATIO: 4.2
CO2 SERPL-SCNC: 23 MMOL/L (ref 20–31)
CREAT SERPL-MCNC: 1 MG/DL (ref 0.7–1.2)
EOSINOPHIL # BLD: 0.19 K/UL (ref 0–0.44)
EOSINOPHILS RELATIVE PERCENT: 2 % (ref 1–4)
ERYTHROCYTE [DISTWIDTH] IN BLOOD BY AUTOMATED COUNT: 15.9 % (ref 11.8–14.4)
GFR, ESTIMATED: 85 ML/MIN/1.73M2
GLUCOSE SERPL-MCNC: 143 MG/DL (ref 74–99)
HCT VFR BLD AUTO: 41.4 % (ref 40.7–50.3)
HDLC SERPL-MCNC: 41 MG/DL
HGB BLD-MCNC: 14 G/DL (ref 13–17)
IMM GRANULOCYTES # BLD AUTO: 0.03 K/UL (ref 0–0.3)
IMM GRANULOCYTES NFR BLD: 0 %
LDLC SERPL CALC-MCNC: 97 MG/DL (ref 0–100)
LYMPHOCYTES NFR BLD: 2.98 K/UL (ref 1.1–3.7)
LYMPHOCYTES RELATIVE PERCENT: 29 % (ref 24–43)
MCH RBC QN AUTO: 27.5 PG (ref 25.2–33.5)
MCHC RBC AUTO-ENTMCNC: 33.8 G/DL (ref 28.4–34.8)
MCV RBC AUTO: 81.2 FL (ref 82.6–102.9)
MONOCYTES NFR BLD: 0.82 K/UL (ref 0.1–1.2)
MONOCYTES NFR BLD: 8 % (ref 3–12)
NEUTROPHILS NFR BLD: 61 % (ref 36–65)
NEUTS SEG NFR BLD: 6.07 K/UL (ref 1.5–8.1)
NRBC BLD-RTO: 0 PER 100 WBC
PLATELET # BLD AUTO: 207 K/UL (ref 138–453)
PMV BLD AUTO: 10 FL (ref 8.1–13.5)
POTASSIUM SERPL-SCNC: 3.8 MMOL/L (ref 3.7–5.3)
PSA SERPL-MCNC: 0.33 NG/ML (ref 0–4)
RBC # BLD AUTO: 5.1 M/UL (ref 4.21–5.77)
SODIUM SERPL-SCNC: 141 MMOL/L (ref 136–145)
TRIGL SERPL-MCNC: 163 MG/DL
VLDLC SERPL CALC-MCNC: 33 MG/DL (ref 1–30)
WBC OTHER # BLD: 10.1 K/UL (ref 3.5–11.3)

## 2025-03-14 PROCEDURE — 80048 BASIC METABOLIC PNL TOTAL CA: CPT

## 2025-03-14 PROCEDURE — 85025 COMPLETE CBC W/AUTO DIFF WBC: CPT

## 2025-03-14 PROCEDURE — G0103 PSA SCREENING: HCPCS

## 2025-03-14 PROCEDURE — 36415 COLL VENOUS BLD VENIPUNCTURE: CPT

## 2025-03-14 PROCEDURE — 84460 ALANINE AMINO (ALT) (SGPT): CPT

## 2025-03-14 PROCEDURE — 80061 LIPID PANEL: CPT

## 2025-03-14 PROCEDURE — 84450 TRANSFERASE (AST) (SGOT): CPT

## 2025-03-20 ENCOUNTER — RESULTS FOLLOW-UP (OUTPATIENT)
Dept: PRIMARY CARE CLINIC | Age: 65
End: 2025-03-20

## 2025-03-20 NOTE — TELEPHONE ENCOUNTER
----- Message from NURY Nettles CNP sent at 3/20/2025  9:57 AM EDT -----  Results are stable, please call patient and make them aware.